# Patient Record
Sex: FEMALE | Race: WHITE | Employment: FULL TIME | URBAN - NONMETROPOLITAN AREA
[De-identification: names, ages, dates, MRNs, and addresses within clinical notes are randomized per-mention and may not be internally consistent; named-entity substitution may affect disease eponyms.]

---

## 2017-01-20 ENCOUNTER — HISTORY (OUTPATIENT)
Dept: FAMILY MEDICINE | Facility: OTHER | Age: 32
End: 2017-01-20

## 2017-01-20 ENCOUNTER — OFFICE VISIT - GICH (OUTPATIENT)
Dept: FAMILY MEDICINE | Facility: OTHER | Age: 32
End: 2017-01-20

## 2017-01-20 DIAGNOSIS — J02.9 ACUTE PHARYNGITIS: ICD-10-CM

## 2017-01-20 LAB — STREP A ANTIGEN - HISTORICAL: NEGATIVE

## 2017-02-13 ENCOUNTER — OFFICE VISIT - GICH (OUTPATIENT)
Dept: FAMILY MEDICINE | Facility: OTHER | Age: 32
End: 2017-02-13

## 2017-02-13 ENCOUNTER — HISTORY (OUTPATIENT)
Dept: FAMILY MEDICINE | Facility: OTHER | Age: 32
End: 2017-02-13

## 2017-02-13 DIAGNOSIS — N92.6 IRREGULAR MENSTRUATION: ICD-10-CM

## 2017-02-13 DIAGNOSIS — Z3A.01 LESS THAN 8 WEEKS GESTATION OF PREGNANCY: ICD-10-CM

## 2017-02-13 LAB — HCG UR QL: POSITIVE

## 2017-02-15 ENCOUNTER — COMMUNICATION - GICH (OUTPATIENT)
Dept: FAMILY MEDICINE | Facility: OTHER | Age: 32
End: 2017-02-15

## 2017-03-10 ENCOUNTER — HISTORY (OUTPATIENT)
Dept: FAMILY MEDICINE | Facility: OTHER | Age: 32
End: 2017-03-10

## 2017-03-10 ENCOUNTER — PRENATAL OFFICE VISIT - GICH (OUTPATIENT)
Dept: FAMILY MEDICINE | Facility: OTHER | Age: 32
End: 2017-03-10

## 2017-03-10 DIAGNOSIS — Z34.80 ENCOUNTER FOR SUPERVISION OF OTHER NORMAL PREGNANCY, UNSPECIFIED TRIMESTER: ICD-10-CM

## 2017-03-10 DIAGNOSIS — Z33.1 PREGNANT STATE, INCIDENTAL: ICD-10-CM

## 2017-03-10 DIAGNOSIS — Z12.4 ENCOUNTER FOR SCREENING FOR MALIGNANT NEOPLASM OF CERVIX: ICD-10-CM

## 2017-03-10 LAB
ABORH - HISTORICAL: NORMAL
ANTIBODY SCREEN - HISTORICAL: NEGATIVE
BILIRUB UR QL: NEGATIVE
CLARITY, URINE: CLEAR CLARITY
COLOR UR: YELLOW COLOR
GLUCOSE URINE: NEGATIVE MG/DL
HEMOGLOBIN: 13 G/DL (ref 12–16)
KETONES UR QL: NEGATIVE MG/DL
LEUKOCYTE ESTERASE URINE: NEGATIVE
MCV RBC AUTO: 88 FL (ref 80–100)
NITRITE UR QL STRIP: NEGATIVE
OCCULT BLOOD,URINE - HISTORICAL: NEGATIVE
PH UR: 6.5 [PH]
PROTEIN QUALITATIVE,URINE - HISTORICAL: NEGATIVE MG/DL
RUBELLA COMMENT - HISTORICAL: NORMAL
SP GR UR STRIP: 1.02
SPECIMEN EXPIRATION DATE/TIME - HISTORICAL: NORMAL
UROBILINOGEN,QUALITATIVE - HISTORICAL: NORMAL EU/DL

## 2017-03-13 LAB
HBSAG CATEGORY - HISTORICAL: NONREACTIVE
HIV-1/HIV-2 ANTIBODY CATEGORY - HISTORICAL: NORMAL

## 2017-03-14 LAB — TREPONEMA PALLIDUM - HISTORICAL: NEGATIVE

## 2017-03-15 ENCOUNTER — HOSPITAL ENCOUNTER (OUTPATIENT)
Dept: RADIOLOGY | Facility: OTHER | Age: 32
End: 2017-03-15
Attending: FAMILY MEDICINE

## 2017-03-15 DIAGNOSIS — Z34.80 ENCOUNTER FOR SUPERVISION OF OTHER NORMAL PREGNANCY, UNSPECIFIED TRIMESTER: ICD-10-CM

## 2017-03-17 LAB — HPV RESULTS - HISTORICAL: NEGATIVE

## 2017-04-07 ENCOUNTER — PRENATAL OFFICE VISIT - GICH (OUTPATIENT)
Dept: FAMILY MEDICINE | Facility: OTHER | Age: 32
End: 2017-04-07

## 2017-04-07 ENCOUNTER — HISTORY (OUTPATIENT)
Dept: FAMILY MEDICINE | Facility: OTHER | Age: 32
End: 2017-04-07

## 2017-04-07 DIAGNOSIS — Z33.1 PREGNANT STATE, INCIDENTAL: ICD-10-CM

## 2017-04-07 LAB
AMORPHOUS - HISTORICAL: PRESENT
BACTERIA URINE: ABNORMAL BACTERIA/HPF
BILIRUB UR QL: NEGATIVE
CLARITY, URINE: ABNORMAL CLARITY
COLOR UR: YELLOW COLOR
EPITHELIAL CELLS: ABNORMAL EPI/HPF
GLUCOSE URINE: NEGATIVE MG/DL
KETONES UR QL: NEGATIVE MG/DL
LEUKOCYTE ESTERASE URINE: ABNORMAL
NITRITE UR QL STRIP: NEGATIVE
OCCULT BLOOD,URINE - HISTORICAL: NEGATIVE
PH UR: 7 [PH]
PROTEIN QUALITATIVE,URINE - HISTORICAL: NEGATIVE MG/DL
RBC - HISTORICAL: ABNORMAL /HPF
SP GR UR STRIP: 1.02
UROBILINOGEN,QUALITATIVE - HISTORICAL: NORMAL EU/DL
WBC - HISTORICAL: ABNORMAL /HPF

## 2017-05-05 ENCOUNTER — HISTORY (OUTPATIENT)
Dept: FAMILY MEDICINE | Facility: OTHER | Age: 32
End: 2017-05-05

## 2017-05-05 ENCOUNTER — PRENATAL OFFICE VISIT - GICH (OUTPATIENT)
Dept: FAMILY MEDICINE | Facility: OTHER | Age: 32
End: 2017-05-05

## 2017-05-05 ENCOUNTER — HOSPITAL ENCOUNTER (OUTPATIENT)
Dept: RADIOLOGY | Facility: OTHER | Age: 32
End: 2017-05-05
Attending: FAMILY MEDICINE

## 2017-05-05 DIAGNOSIS — Z33.1 PREGNANT STATE, INCIDENTAL: ICD-10-CM

## 2017-05-05 LAB
BACTERIA URINE: ABNORMAL BACTERIA/HPF
BILIRUB UR QL: NEGATIVE
CLARITY, URINE: CLEAR CLARITY
COLOR UR: YELLOW COLOR
EPITHELIAL CELLS: ABNORMAL EPI/HPF
GLUCOSE URINE: NEGATIVE MG/DL
KETONES UR QL: NEGATIVE MG/DL
LEUKOCYTE ESTERASE URINE: ABNORMAL
NITRITE UR QL STRIP: NEGATIVE
OCCULT BLOOD,URINE - HISTORICAL: NEGATIVE
PH UR: 7.5 [PH]
PROTEIN QUALITATIVE,URINE - HISTORICAL: NEGATIVE MG/DL
RBC - HISTORICAL: ABNORMAL /HPF
SP GR UR STRIP: 1.02
UROBILINOGEN,QUALITATIVE - HISTORICAL: NORMAL EU/DL
WBC - HISTORICAL: ABNORMAL /HPF

## 2017-05-09 ENCOUNTER — AMBULATORY - GICH (OUTPATIENT)
Dept: FAMILY MEDICINE | Facility: OTHER | Age: 32
End: 2017-05-09

## 2017-05-09 DIAGNOSIS — N76.0 ACUTE VAGINITIS: ICD-10-CM

## 2017-05-09 DIAGNOSIS — B96.89 OTHER SPECIFIED BACTERIAL AGENTS AS THE CAUSE OF DISEASES CLASSIFIED ELSEWHERE: ICD-10-CM

## 2017-06-05 ENCOUNTER — HISTORY (OUTPATIENT)
Dept: FAMILY MEDICINE | Facility: OTHER | Age: 32
End: 2017-06-05

## 2017-06-05 ENCOUNTER — PRENATAL OFFICE VISIT - GICH (OUTPATIENT)
Dept: FAMILY MEDICINE | Facility: OTHER | Age: 32
End: 2017-06-05

## 2017-06-05 DIAGNOSIS — Z33.1 PREGNANT STATE, INCIDENTAL: ICD-10-CM

## 2017-06-05 LAB
BILIRUB UR QL: NEGATIVE
CLARITY, URINE: CLEAR CLARITY
COLOR UR: YELLOW COLOR
GLUCOSE URINE: NEGATIVE MG/DL
KETONES UR QL: NEGATIVE MG/DL
LEUKOCYTE ESTERASE URINE: NEGATIVE
NITRITE UR QL STRIP: NEGATIVE
OCCULT BLOOD,URINE - HISTORICAL: NEGATIVE
PH UR: 6.5 [PH]
PROTEIN QUALITATIVE,URINE - HISTORICAL: NEGATIVE MG/DL
SP GR UR STRIP: 1.01
UROBILINOGEN,QUALITATIVE - HISTORICAL: NORMAL EU/DL

## 2017-06-08 ENCOUNTER — HOSPITAL ENCOUNTER (OUTPATIENT)
Dept: RADIOLOGY | Facility: OTHER | Age: 32
End: 2017-06-08
Attending: FAMILY MEDICINE

## 2017-06-08 DIAGNOSIS — Z33.1 PREGNANT STATE, INCIDENTAL: ICD-10-CM

## 2017-06-27 ENCOUNTER — HISTORY (OUTPATIENT)
Dept: FAMILY MEDICINE | Facility: OTHER | Age: 32
End: 2017-06-27

## 2017-06-27 ENCOUNTER — PRENATAL OFFICE VISIT - GICH (OUTPATIENT)
Dept: FAMILY MEDICINE | Facility: OTHER | Age: 32
End: 2017-06-27

## 2017-06-27 DIAGNOSIS — Z33.1 PREGNANT STATE, INCIDENTAL: ICD-10-CM

## 2017-06-27 LAB
BILIRUB UR QL: NEGATIVE
CLARITY, URINE: CLEAR CLARITY
COLOR UR: YELLOW COLOR
GLUCOSE URINE: NEGATIVE MG/DL
KETONES UR QL: NEGATIVE MG/DL
LEUKOCYTE ESTERASE URINE: NEGATIVE
NITRITE UR QL STRIP: NEGATIVE
OCCULT BLOOD,URINE - HISTORICAL: NEGATIVE
PH UR: 7 [PH]
PROTEIN QUALITATIVE,URINE - HISTORICAL: NEGATIVE MG/DL
SP GR UR STRIP: 1.02
UROBILINOGEN,QUALITATIVE - HISTORICAL: NORMAL EU/DL

## 2017-07-24 ENCOUNTER — COMMUNICATION - GICH (OUTPATIENT)
Dept: FAMILY MEDICINE | Facility: OTHER | Age: 32
End: 2017-07-24

## 2017-07-24 DIAGNOSIS — Z33.1 PREGNANT STATE, INCIDENTAL: ICD-10-CM

## 2017-07-25 ENCOUNTER — AMBULATORY - GICH (OUTPATIENT)
Dept: LAB | Facility: OTHER | Age: 32
End: 2017-07-25

## 2017-07-25 ENCOUNTER — HISTORY (OUTPATIENT)
Dept: FAMILY MEDICINE | Facility: OTHER | Age: 32
End: 2017-07-25

## 2017-07-25 ENCOUNTER — PRENATAL OFFICE VISIT - GICH (OUTPATIENT)
Dept: FAMILY MEDICINE | Facility: OTHER | Age: 32
End: 2017-07-25

## 2017-07-25 DIAGNOSIS — Z33.1 PREGNANT STATE, INCIDENTAL: ICD-10-CM

## 2017-07-25 LAB
BACTERIA URINE: ABNORMAL BACTERIA/HPF
BILIRUB UR QL: NEGATIVE
CLARITY, URINE: CLEAR CLARITY
COLOR UR: YELLOW COLOR
EPITHELIAL CELLS: ABNORMAL EPI/HPF
GLU GEST SCREEN 1HR 50G: 102 MG/DL (ref 65–139)
GLUCOSE URINE: NEGATIVE MG/DL
HEMOGLOBIN: 11.4 G/DL (ref 12–16)
KETONES UR QL: NEGATIVE MG/DL
LEUKOCYTE ESTERASE URINE: NEGATIVE
MCV RBC AUTO: 89 FL (ref 80–100)
NITRITE UR QL STRIP: NEGATIVE
OCCULT BLOOD,URINE - HISTORICAL: NEGATIVE
PATIENT STATUS - HISTORICAL: NORMAL
PH UR: 6 [PH]
PROTEIN QUALITATIVE,URINE - HISTORICAL: 30 MG/DL
RBC - HISTORICAL: ABNORMAL /HPF
SP GR UR STRIP: 1.02
UROBILINOGEN,QUALITATIVE - HISTORICAL: NORMAL EU/DL
WBC - HISTORICAL: ABNORMAL /HPF

## 2017-07-26 LAB — TREPONEMA PALLIDUM - HISTORICAL: NEGATIVE

## 2017-07-27 ENCOUNTER — AMBULATORY - GICH (OUTPATIENT)
Dept: FAMILY MEDICINE | Facility: OTHER | Age: 32
End: 2017-07-27

## 2017-08-08 ENCOUNTER — HISTORY (OUTPATIENT)
Dept: FAMILY MEDICINE | Facility: OTHER | Age: 32
End: 2017-08-08

## 2017-08-08 ENCOUNTER — PRENATAL OFFICE VISIT - GICH (OUTPATIENT)
Dept: FAMILY MEDICINE | Facility: OTHER | Age: 32
End: 2017-08-08

## 2017-08-08 DIAGNOSIS — Z33.1 PREGNANT STATE, INCIDENTAL: ICD-10-CM

## 2017-08-22 ENCOUNTER — PRENATAL OFFICE VISIT - GICH (OUTPATIENT)
Dept: FAMILY MEDICINE | Facility: OTHER | Age: 32
End: 2017-08-22

## 2017-08-22 ENCOUNTER — HISTORY (OUTPATIENT)
Dept: FAMILY MEDICINE | Facility: OTHER | Age: 32
End: 2017-08-22

## 2017-08-22 DIAGNOSIS — Z33.1 PREGNANT STATE, INCIDENTAL: ICD-10-CM

## 2017-08-22 LAB
BACTERIA URINE: ABNORMAL BACTERIA/HPF
BILIRUB UR QL: NEGATIVE
CLARITY, URINE: ABNORMAL CLARITY
COLOR UR: YELLOW COLOR
EPITHELIAL CELLS: ABNORMAL EPI/HPF
GLUCOSE URINE: NEGATIVE MG/DL
KETONES UR QL: NEGATIVE MG/DL
LEUKOCYTE ESTERASE URINE: NEGATIVE
NITRITE UR QL STRIP: NEGATIVE
OCCULT BLOOD,URINE - HISTORICAL: NEGATIVE
PH UR: 7 [PH]
PROTEIN QUALITATIVE,URINE - HISTORICAL: NEGATIVE MG/DL
RBC - HISTORICAL: ABNORMAL /HPF
SP GR UR STRIP: 1.01
UROBILINOGEN,QUALITATIVE - HISTORICAL: NORMAL EU/DL
WBC - HISTORICAL: ABNORMAL /HPF

## 2017-09-05 ENCOUNTER — PRENATAL OFFICE VISIT - GICH (OUTPATIENT)
Dept: FAMILY MEDICINE | Facility: OTHER | Age: 32
End: 2017-09-05

## 2017-09-05 ENCOUNTER — HISTORY (OUTPATIENT)
Dept: FAMILY MEDICINE | Facility: OTHER | Age: 32
End: 2017-09-05

## 2017-09-05 DIAGNOSIS — Z33.1 PREGNANT STATE, INCIDENTAL: ICD-10-CM

## 2017-09-05 DIAGNOSIS — Z23 ENCOUNTER FOR IMMUNIZATION: ICD-10-CM

## 2017-09-05 LAB
BACTERIA URINE: ABNORMAL BACTERIA/HPF
BILIRUB UR QL: NEGATIVE
CLARITY, URINE: CLEAR CLARITY
COLOR UR: YELLOW COLOR
EPITHELIAL CELLS: ABNORMAL EPI/HPF
GLUCOSE URINE: NEGATIVE MG/DL
KETONES UR QL: NEGATIVE MG/DL
LEUKOCYTE ESTERASE URINE: ABNORMAL
NITRITE UR QL STRIP: NEGATIVE
OCCULT BLOOD,URINE - HISTORICAL: NEGATIVE
PH UR: 7 [PH]
PROTEIN QUALITATIVE,URINE - HISTORICAL: NEGATIVE MG/DL
RBC - HISTORICAL: ABNORMAL /HPF
SP GR UR STRIP: 1.01
UROBILINOGEN,QUALITATIVE - HISTORICAL: NORMAL EU/DL
WBC - HISTORICAL: ABNORMAL /HPF

## 2017-09-25 ENCOUNTER — AMBULATORY - GICH (OUTPATIENT)
Dept: FAMILY MEDICINE | Facility: OTHER | Age: 32
End: 2017-09-25

## 2017-09-26 ENCOUNTER — HISTORY (OUTPATIENT)
Dept: FAMILY MEDICINE | Facility: OTHER | Age: 32
End: 2017-09-26

## 2017-09-26 ENCOUNTER — PRENATAL OFFICE VISIT - GICH (OUTPATIENT)
Dept: FAMILY MEDICINE | Facility: OTHER | Age: 32
End: 2017-09-26

## 2017-09-26 DIAGNOSIS — Z33.1 PREGNANT STATE, INCIDENTAL: ICD-10-CM

## 2017-09-26 LAB
BACTERIA URINE: ABNORMAL BACTERIA/HPF
BILIRUB UR QL: NEGATIVE
CLARITY, URINE: ABNORMAL CLARITY
COLOR UR: YELLOW COLOR
EPITHELIAL CELLS: ABNORMAL EPI/HPF
GLUCOSE URINE: NEGATIVE MG/DL
HEMOGLOBIN: 11.7 G/DL (ref 12–16)
KETONES UR QL: NEGATIVE MG/DL
LEUKOCYTE ESTERASE URINE: ABNORMAL
MCV RBC AUTO: 88 FL (ref 80–100)
NITRITE UR QL STRIP: NEGATIVE
OCCULT BLOOD,URINE - HISTORICAL: NEGATIVE
PH UR: 7 [PH]
PROTEIN QUALITATIVE,URINE - HISTORICAL: ABNORMAL MG/DL
RBC - HISTORICAL: ABNORMAL /HPF
SP GR UR STRIP: 1.01
UROBILINOGEN,QUALITATIVE - HISTORICAL: NORMAL EU/DL
WBC - HISTORICAL: ABNORMAL /HPF

## 2017-09-27 LAB
CULTURE - HISTORICAL: ABNORMAL
CULTURE - HISTORICAL: ABNORMAL

## 2017-10-03 ENCOUNTER — HISTORY (OUTPATIENT)
Dept: FAMILY MEDICINE | Facility: OTHER | Age: 32
End: 2017-10-03

## 2017-10-03 ENCOUNTER — PRENATAL OFFICE VISIT - GICH (OUTPATIENT)
Dept: FAMILY MEDICINE | Facility: OTHER | Age: 32
End: 2017-10-03

## 2017-10-03 DIAGNOSIS — Z33.1 PREGNANT STATE, INCIDENTAL: ICD-10-CM

## 2017-10-03 LAB
AMORPHOUS - HISTORICAL: PRESENT
BACTERIA URINE: ABNORMAL BACTERIA/HPF
BILIRUB UR QL: NEGATIVE
CLARITY, URINE: ABNORMAL CLARITY
COLOR UR: YELLOW COLOR
EPITHELIAL CELLS: ABNORMAL EPI/HPF
GLUCOSE URINE: NEGATIVE MG/DL
KETONES UR QL: NEGATIVE MG/DL
LEUKOCYTE ESTERASE URINE: ABNORMAL
NITRITE UR QL STRIP: NEGATIVE
OCCULT BLOOD,URINE - HISTORICAL: NEGATIVE
PH UR: 6.5 [PH]
PROTEIN QUALITATIVE,URINE - HISTORICAL: ABNORMAL MG/DL
RBC - HISTORICAL: ABNORMAL /HPF
SP GR UR STRIP: 1.01
UROBILINOGEN,QUALITATIVE - HISTORICAL: NORMAL EU/DL
WBC - HISTORICAL: ABNORMAL /HPF

## 2017-10-09 ENCOUNTER — PRENATAL OFFICE VISIT - GICH (OUTPATIENT)
Dept: FAMILY MEDICINE | Facility: OTHER | Age: 32
End: 2017-10-09

## 2017-10-09 ENCOUNTER — HISTORY (OUTPATIENT)
Dept: FAMILY MEDICINE | Facility: OTHER | Age: 32
End: 2017-10-09

## 2017-10-09 DIAGNOSIS — Z33.1 PREGNANT STATE, INCIDENTAL: ICD-10-CM

## 2017-10-09 LAB
BACTERIA URINE: ABNORMAL BACTERIA/HPF
BILIRUB UR QL: NEGATIVE
CLARITY, URINE: CLEAR CLARITY
COLOR UR: YELLOW COLOR
EPITHELIAL CELLS: ABNORMAL EPI/HPF
GLUCOSE URINE: NEGATIVE MG/DL
KETONES UR QL: NEGATIVE MG/DL
LEUKOCYTE ESTERASE URINE: ABNORMAL
NITRITE UR QL STRIP: NEGATIVE
OCCULT BLOOD,URINE - HISTORICAL: NEGATIVE
PH UR: 6.5 [PH]
PROTEIN QUALITATIVE,URINE - HISTORICAL: NEGATIVE MG/DL
RBC - HISTORICAL: ABNORMAL /HPF
SP GR UR STRIP: <=1.005
UROBILINOGEN,QUALITATIVE - HISTORICAL: NORMAL EU/DL
WBC - HISTORICAL: ABNORMAL /HPF

## 2017-10-16 ENCOUNTER — HISTORY (OUTPATIENT)
Dept: FAMILY MEDICINE | Facility: OTHER | Age: 32
End: 2017-10-16

## 2017-10-16 ENCOUNTER — PRENATAL OFFICE VISIT - GICH (OUTPATIENT)
Dept: FAMILY MEDICINE | Facility: OTHER | Age: 32
End: 2017-10-16

## 2017-10-16 DIAGNOSIS — Z33.1 PREGNANT STATE, INCIDENTAL: ICD-10-CM

## 2017-10-16 LAB
BACTERIA URINE: ABNORMAL BACTERIA/HPF
BILIRUB UR QL: NEGATIVE
CLARITY, URINE: ABNORMAL CLARITY
COLOR UR: YELLOW COLOR
EPITHELIAL CELLS: ABNORMAL EPI/HPF
GLUCOSE URINE: NEGATIVE MG/DL
KETONES UR QL: NEGATIVE MG/DL
LEUKOCYTE ESTERASE URINE: ABNORMAL
NITRITE UR QL STRIP: NEGATIVE
OCCULT BLOOD,URINE - HISTORICAL: NEGATIVE
PH UR: 6.5 [PH]
PROTEIN QUALITATIVE,URINE - HISTORICAL: NEGATIVE MG/DL
RBC - HISTORICAL: ABNORMAL /HPF
SP GR UR STRIP: <=1.005
UROBILINOGEN,QUALITATIVE - HISTORICAL: NORMAL EU/DL
WBC - HISTORICAL: ABNORMAL /HPF

## 2017-10-23 ENCOUNTER — PRENATAL OFFICE VISIT - GICH (OUTPATIENT)
Dept: FAMILY MEDICINE | Facility: OTHER | Age: 32
End: 2017-10-23

## 2017-10-23 ENCOUNTER — HISTORY (OUTPATIENT)
Dept: FAMILY MEDICINE | Facility: OTHER | Age: 32
End: 2017-10-23

## 2017-10-23 DIAGNOSIS — Z33.1 PREGNANT STATE, INCIDENTAL: ICD-10-CM

## 2017-10-23 LAB
BILIRUB UR QL: NEGATIVE
CLARITY, URINE: CLEAR CLARITY
COLOR UR: YELLOW COLOR
GLUCOSE URINE: NEGATIVE MG/DL
KETONES UR QL: NEGATIVE MG/DL
LEUKOCYTE ESTERASE URINE: NEGATIVE
NITRITE UR QL STRIP: NEGATIVE
OCCULT BLOOD,URINE - HISTORICAL: NEGATIVE
PH UR: 7 [PH]
PROTEIN QUALITATIVE,URINE - HISTORICAL: NEGATIVE MG/DL
SP GR UR STRIP: 1.01
UROBILINOGEN,QUALITATIVE - HISTORICAL: NORMAL EU/DL

## 2017-10-27 ENCOUNTER — HISTORY (OUTPATIENT)
Dept: OBGYN | Facility: OTHER | Age: 32
End: 2017-10-27

## 2017-12-08 ENCOUNTER — HISTORY (OUTPATIENT)
Dept: FAMILY MEDICINE | Facility: OTHER | Age: 32
End: 2017-12-08

## 2017-12-08 ENCOUNTER — OFFICE VISIT - GICH (OUTPATIENT)
Dept: FAMILY MEDICINE | Facility: OTHER | Age: 32
End: 2017-12-08

## 2017-12-08 ASSESSMENT — PATIENT HEALTH QUESTIONNAIRE - PHQ9: SUM OF ALL RESPONSES TO PHQ QUESTIONS 1-9: 0

## 2017-12-27 NOTE — PROGRESS NOTES
Patient Information     Patient Name MRN Sex Sherin Matthew [id number] Female 1985      Progress Notes by Lori Michaud MD at 2017 11:15 AM     Author:  Lori Michaud MD Service:  (none) Author Type:  Physician     Filed:  2017 12:37 PM Encounter Date:  2017 Status:  Signed     :  Lori Michaud MD (Physician)            No bleeding or fluid leaking.  Gets occasional cramps, but no certain contractions.  Follow up in weeks for next OB visit, sooner if any other problems develop.  Lori Michaud MD ....................  2017   12:37 PM

## 2017-12-27 NOTE — PROGRESS NOTES
Patient Information     Patient Name MRN Sherin Vigil [id number] Female 1985      Progress Notes by Charlotte Tripp at 2017  3:07 PM     Author:  Charlotte Tripp Service:  (none) Author Type:  Other Clinical Staff     Filed:  2017  3:54 PM Date of Service:  2017  3:07 PM Status:  Signed     :  Charlotte Tripp (Other Clinical Staff)            Falls Risk Criteria:    Age 65 and older or under age 4        Sensory deficits    Poor vision    Use of ambulatory aides    Impaired judgment    Unable to walk independently    Meets High Risk criteria for falls:  No

## 2017-12-27 NOTE — PROGRESS NOTES
Patient Information     Patient Name MRN Sex Sherin Matthew [id number] Female 1985      Progress Notes by Lori Michaud MD at 2017 11:15 AM     Author:  Lori Michaud MD Service:  (none) Author Type:  Physician     Filed:  2017 11:51 AM Encounter Date:  2017 Status:  Signed     :  Lori Michaud MD (Physician)            No bleeding or fluid leaking.  No contractions.  GBS and Hemoglobin completed today.  Flu shot declined.  Follow up in 1 week.  Lori Michaud MD ....................  2017   11:51 AM

## 2017-12-27 NOTE — PROGRESS NOTES
Patient Information     Patient Name MRN Sex Sherin Matthew [id number] Female 1985      Progress Notes by Lori Michaud MD at 10/3/2017 11:15 AM     Author:  Lori Michaud MD Service:  (none) Author Type:  Physician     Filed:  10/3/2017 12:41 PM Encounter Date:  10/3/2017 Status:  Signed     :  Lori Michaud MD (Physician)            No bleeding or fluid leaking.  No contractions.  GBS +.  Follow up in 1 week for next OB visit.  Lori Michaud MD ....................  10/3/2017   12:41 PM

## 2017-12-27 NOTE — PROGRESS NOTES
Patient Information     Patient Name MRN Sex Sherin Matthew [id number] Female 1985      Progress Notes by Lori Michaud MD at 2017 12:45 PM     Author:  Lori Michaud MD Service:  (none) Author Type:  Physician     Filed:  2017  1:25 PM Encounter Date:  2017 Status:  Signed     :  Lori Michaud MD (Physician)            No bleeding or fluid leaking.  No contractions.  Declines Quad screen.  20 week ultrasound scheduled.  Next appointment scheduled in 3 weeks.  Lori Michaud MD ....................  2017   1:24 PM

## 2017-12-28 NOTE — TELEPHONE ENCOUNTER
Patient Information     Patient Name MRN Sherin Vigil [id number] Female 1985      Telephone Encounter by Mirian Rivers at 2017 10:38 AM     Author:  Mirian Rivers Service:  (none) Author Type:  (none)     Filed:  2017 10:39 AM Encounter Date:  2017 Status:  Signed     :  Mirian Rivers            PT WOULD LIKE TO SPEAK WITH NURSE REGARDING GLUCOSE APPT.

## 2017-12-28 NOTE — PROGRESS NOTES
Patient Information     Patient Name MRN Sex Sherin Matthew [id number] Female 1985      Progress Notes by Lori Michaud MD at 10/16/2017 10:30 AM     Author:  Lori Michaud MD Service:  (none) Author Type:  Physician     Filed:  10/16/2017  4:15 PM Encounter Date:  10/16/2017 Status:  Signed     :  Lori Michaud MD (Physician)            No bleeding or fluid leaking.  No contractions.      Baby Doc: JVC  Dates based on: early ultrasound - 8 week.  Blood Type: A pos  Rubella:  Imm  Flu Shot: declined.  Tdap: 17  Sex of baby; male  GBS +    Follow up in 1 week, sooner if any signs of labor or other concerns.  Lori Michaud MD ....................  10/16/2017   4:15 PM

## 2017-12-28 NOTE — PROGRESS NOTES
Patient Information     Patient Name MRN Sex Sherin Matthew [id number] Female 1985      Progress Notes by Lori Michaud MD at 2017 11:15 AM     Author:  Lori Michaud MD Service:  (none) Author Type:  Physician     Filed:  2017 12:14 PM Encounter Date:  2017 Status:  Signed     :  Lori Michaud MD (Physician)            No bleeding, fluid leaking or contractions.  Tdap updated today.  Follow up in 2 weeks for next OB visit.  Lori Michaud MD ....................  2017   12:00 PM

## 2017-12-28 NOTE — PROGRESS NOTES
Patient Information     Patient Name MRN Sherin Vigil [id number] Female 1985      Progress Notes by Lori Michaud MD at 2017 11:15 AM     Author:  Lori Michaud MD Service:  (none) Author Type:  Physician     Filed:  2017 11:46 AM Encounter Date:  2017 Status:  Signed     :  Lori Michaud MD (Physician)            No bleeding or fluid leaking.  No contractions.  Follow up in 2 weeks, sooner if any problems develop.  Lori Michaud MD ....................  2017   11:45 AM

## 2017-12-28 NOTE — TELEPHONE ENCOUNTER
Patient Information     Patient Name MRN Sherin Vigil [id number] Female 1985      Telephone Encounter by Lori Michaud MD at 2017 12:31 PM     Author:  Lori Michaud MD Service:  (none) Author Type:  Physician     Filed:  2017 12:31 PM Encounter Date:  2017 Status:  Signed     :  Lori Michaud MD (Physician)            Orders for 1 hour GTT, Hemoglobin and Treponema Pallidum were signed.  Lori Michaud MD ....................  2017   12:31 PM

## 2017-12-28 NOTE — PROGRESS NOTES
Patient Information     Patient Name MRN Sex Sherin Matthew [id number] Female 1985      Progress Notes by Lori Michaud MD at 10/9/2017 10:30 AM     Author:  Lori Michaud MD Service:  (none) Author Type:  Physician     Filed:  10/9/2017 11:05 AM Encounter Date:  10/9/2017 Status:  Signed     :  Lori Michaud MD (Physician)            Occasional contractions.  No bleeding or fluid leaking.  Declines flu shot.  Follow up in 1 week for next OB visit, sooner if any other problems develop.  Lori Michaud MD ....................  10/9/2017   11:05 AM

## 2017-12-28 NOTE — PROGRESS NOTES
Patient Information     Patient Name MRN Sex Sherin Matthew [id number] Female 1985      Progress Notes by Lori Michaud MD at 2017 11:15 AM     Author:  Lori Michaud MD Service:  (none) Author Type:  Physician     Filed:  2017 12:55 PM Encounter Date:  2017 Status:  Signed     :  Lori Michaud MD (Physician)            Doing well.  It's a boy!  No bleeding, fluid leaking, cramping.  Reviewed ultrasound results.  Follow up in 4 weeks for next OB visit, sooner if any other problems develop.  Lori Michaud MD ....................  2017   12:36 PM

## 2017-12-28 NOTE — TELEPHONE ENCOUNTER
Patient Information     Patient Name MRN Sherin Vigil [id number] Female 1985      Telephone Encounter by Kelsi Calderon at 2017 10:54 AM     Author:  Kelsi Calderon Service:  (none) Author Type:  (none)     Filed:  2017 11:00 AM Encounter Date:  2017 Status:  Signed     :  Kelsi Calderon            Patient calling wondering if can do Glucola test before appointment tomorrow. Lab order pended.  Kelsi Calderon LPN .............2017  10:57 AM

## 2017-12-28 NOTE — TELEPHONE ENCOUNTER
Patient Information     Patient Name MRN Sherin Vigil [id number] Female 1985      Telephone Encounter by Kelsi Calderon at 2017 12:34 PM     Author:  Kelsi Calderon Service:  (none) Author Type:  (none)     Filed:  2017 12:36 PM Encounter Date:  2017 Status:  Signed     :  Kelsi Calderon            Patient notified transferred to schedule a lab only appointment.  Kelsi Calderon LPN .............2017  12:35 PM

## 2017-12-28 NOTE — PROGRESS NOTES
Patient Information     Patient Name MRN Sex Sherin Matthew [id number] Female 1985      Progress Notes by Lori Michaud MD at 2017 11:15 AM     Author:  Lori Michaud MD Service:  (none) Author Type:  Physician     Filed:  2017 12:01 PM Encounter Date:  2017 Status:  Signed     :  Lori Michaud MD (Physician)            No vaginal bleeding, fluid leaking or contractions.  Hemoglobin 11.4.  1 hour .  Follow up scheduled on 17.  Lori Michaud MD ....................  2017   12:00 PM

## 2017-12-28 NOTE — PROGRESS NOTES
Patient Information     Patient Name MRN Sex Sherin Matthew [id number] Female 1985      Progress Notes by Lori Michaud MD at 10/23/2017 10:30 AM     Author:  Lori Michaud MD Service:  (none) Author Type:  Physician     Filed:  10/23/2017  5:42 PM Encounter Date:  10/23/2017 Status:  Signed     :  Lori Michaud MD (Physician)            Feeling well.  No bleeding or fluid leaking.  Irregular contractions.  Lori Michaud MD ....................  10/23/2017   5:41 PM

## 2017-12-30 NOTE — NURSING NOTE
Patient Information     Patient Name MRN Sherin Vigil [id number] Female 1985      Nursing Note by Brayan Castellanos LPN at 10/16/2017 10:30 AM     Author:  Brayan Castellanos LPN Service:  (none) Author Type:  NURS- Licensed Practical Nurse     Filed:  10/16/2017 10:43 AM Encounter Date:  10/16/2017 Status:  Signed     :  Brayan Castellanos LPN (NURS- Licensed Practical Nurse)            Patient presents to the clinic for her OB visit. She is 38w5d. Two baby steps given.  Brayan Castellanos LPN ..............10/16/2017 10:42 AM

## 2017-12-30 NOTE — NURSING NOTE
Patient Information     Patient Name MRN Sherin Vigil [id number] Female 1985      Nursing Note by Melissa Gruber at 2017 12:45 PM     Author:  Melissa Gruber Service:  (none) Author Type:  (none)     Filed:  2017  1:16 PM Encounter Date:  2017 Status:  Signed     :  Melissa Gruber            1 Baby Steps coupon given today.  Melissa Gruber CMA (AAMA) ....................  2017   12:58 PM

## 2017-12-30 NOTE — NURSING NOTE
Patient Information     Patient Name MRN Sherin Vigil [id number] Female 1985      Nursing Note by Lashell Hendrix at 10/23/2017 10:30 AM     Author:  Lashell Hendrix Service:  (none) Author Type:  (none)     Filed:  10/23/2017 11:01 AM Encounter Date:  10/23/2017 Status:  Signed     :  Lashell Hendrix            Patient here for prenatal visit.  Lashell Hendrix LPN ....................10/23/2017  10:37 AM

## 2017-12-30 NOTE — NURSING NOTE
Patient Information     Patient Name MRN Sherin Vigil [id number] Female 1985      Nursing Note by Lashell Hendrix at 2017 11:15 AM     Author:  Lashell Hendrix Service:  (none) Author Type:  (none)     Filed:  2017 11:31 AM Encounter Date:  2017 Status:  Signed     :  Lashell Hendrix            Patient is here for OB check. 2 baby step coupons given at visit today.  Lashell Hendrix LPN ....................2017  11:24 AM

## 2018-01-03 NOTE — NURSING NOTE
Patient Information     Patient Name MRN Sherin Vigil [id number] Female 1985      Nursing Note by Melany Morgan at 2017  1:45 PM     Author:  Melany Morgan Service:  (none) Author Type:  NURS- Student Practical Nurse     Filed:  2017  2:10 PM Encounter Date:  2017 Status:  Signed     :  Melany Morgan (NURS- Student Practical Nurse)            Patient presents with dryness in throat. No cough or fever present. Symptoms started this morning. Child tested positive for strep. Melany Morgan LPN .............2017  1:47 PM

## 2018-01-03 NOTE — PATIENT INSTRUCTIONS
Patient Information     Patient Name MRN Sherin Vigil [id number] Female 1985      Patient Instructions by Twila Rosales NP at 2017  1:45 PM     Author:  Twila Rosales NP Service:  (none) Author Type:  PHYS- Nurse Practitioner     Filed:  2017  2:11 PM Encounter Date:  2017 Status:  Signed     :  Twila Rosales NP (PHYS- Nurse Practitioner)            Thank you for choosing [facility] and [facility] for your care.     You are advised to contact our office if there is no improvement or if there is worsening of conditions or symptoms, either come back or follow up with your primary care provider.     You were seen in the [facility]. This is for urgent care needs. If you have other questions or concerns please see your primary care provider.     You will need to be evaluated if you start to experience:  Fever higher than 102.5 F (39.2 C)   Trouble seeing or seeing double   Trouble thinking clearly   Trouble breathing or a stiff neck    * If you are a smoker, try to quit *    Call  or go to the emergency room if you:  Have trouble breathing   Chest pain  Abdominal pain    Twila Rosales RN, MSN, FNP  [facility]

## 2018-01-03 NOTE — PROGRESS NOTES
Patient Information     Patient Name MRN Sex Sherin Matthew [id number] Female 1985      Progress Notes by Twila Rosales NP at 2017  1:45 PM     Author:  Twila Rosales NP Service:  (none) Author Type:  PHYS- Nurse Practitioner     Filed:  2017  2:26 PM Encounter Date:  2017 Status:  Signed     :  Twila Rosales NP (PHYS- Nurse Practitioner)            Nursing Notes:   Melany Morgan  2017  2:10 PM  Signed  Patient presents with dryness in throat. No cough or fever present. Symptoms started this morning. Child tested positive for strep. Melany Morgan LPN .............2017  1:47 PM    SUBJECTIVE:    Sherin Antoine is a 31 y.o. female who presents for Throat irritation    Throat Problem    This is a new problem. The current episode started today. Neither side of throat is experiencing more pain than the other. There has been no fever. The patient is experiencing no pain. Pertinent negatives include no abdominal pain, congestion, coughing, diarrhea, drooling, ear discharge, ear pain, headaches, hoarse voice, plugged ear sensation, neck pain, shortness of breath, swollen glands, trouble swallowing or vomiting. Associated symptoms comments: She c/o throat irritation. No other s/s. No fever, chills no URI s/s. . She has had exposure to strep. She has had no exposure to mono. She has tried nothing for the symptoms. The treatment provided no relief.       No current outpatient prescriptions on file prior to visit.     No current facility-administered medications on file prior to visit.        REVIEW OF SYSTEMS:  Review of Systems   HENT: Positive for sore throat. Negative for congestion, drooling, ear discharge, ear pain, hoarse voice and trouble swallowing.    Respiratory: Negative for cough and shortness of breath.    Gastrointestinal: Negative for abdominal pain, diarrhea and vomiting.   Musculoskeletal: Negative for neck pain.   Neurological: Negative  "for headaches.       OBJECTIVE:  /62  Pulse 68  Temp 97.8  F (36.6  C) (Tympanic)  Ht 1.665 m (5' 5.55\")  Wt 78.8 kg (173 lb 12.8 oz)  LMP 01/10/2017  Breastfeeding? No  BMI 28.44 kg/m2    EXAM:   Physical Exam   Constitutional: She is well-developed, well-nourished, and in no distress.   HENT:   Head: Normocephalic and atraumatic.   Right Ear: Tympanic membrane and ear canal normal.   Left Ear: Tympanic membrane and ear canal normal.   Nose: Nose normal.   Mouth/Throat: Uvula is midline, oropharynx is clear and moist and mucous membranes are normal.   Eyes: Conjunctivae are normal.   Neck: Neck supple.   Cardiovascular: Normal rate, regular rhythm and normal heart sounds.    Pulmonary/Chest: Effort normal and breath sounds normal. No respiratory distress. She has no wheezes. She has no rales.   Lymphadenopathy:     She has no cervical adenopathy.   Nursing note and vitals reviewed.    Results for orders placed or performed in visit on 01/20/17      THROAT RAPID STREP A WITH REFLEX      Result  Value Ref Range    STREP A ANTIGEN           Negative Negative     Completed labs at today's visit Rapid Strep.  I personally reviewed the labs with the patient/parent at the visit. Abnormalities include none.     ASSESSMENT/PLAN:    ICD-10-CM    1. Sore throat J02.9 THROAT RAPID STREP A WITH REFLEX      THROAT RAPID STREP A WITH REFLEX        Plan:  Discussed that s/s of strep. Discussed Cent[facility] criteria and that a test is not even needed based on that criteria. Patient education gone over. Home cares and OTC gone over. I explained my diagnostic considerations and recommendations to the patient, who voiced understanding and agreement with the treatment plan. All questions were answered. We discussed potential side effects of any prescribed or recommended therapies, as well as expectations for response to treatments. She was advised to contact our office if there is no improvement or worsening of conditions or symptoms.  " If s/s worsen or persist, patient will either come back or follow up with PCP.        TEAGAN MILLAN NP ....................  1/20/2017   2:25 PM

## 2018-01-03 NOTE — ADDENDUM NOTE
Patient Information     Patient Name MRN Sherin Vigil [id number] Female 1985      Addendum Note by Lily Diaz at 3/16/2017 11:07 AM     Author:  Lily Diaz Service:  (none) Author Type:  (none)     Filed:  3/16/2017 11:07 AM Encounter Date:  3/10/2017 Status:  Signed     :  Lily Diaz       Addended by: LILY DIAZ on: 3/16/2017 11:07 AM        Modules accepted: Orders

## 2018-01-03 NOTE — PATIENT INSTRUCTIONS
Patient Information     Patient Name MRN Sherin Vigil [id number] Female 1985      Patient Instructions by Lori Michaud MD at 3/10/2017  2:48 PM     Author:  Lori Michaud MD Service:  (none) Author Type:  Physician     Filed:  3/10/2017  2:48 PM Encounter Date:  3/10/2017 Status:  Signed     :  Lori Michaud MD (Physician)              Quadruple Test Screening for Neural Tube Defects and Chromosome Abnormalities    Most babies are born healthy. But, about 1 to 2 out of 1,000 babies are born with a  neural tube defect (such as anencephaly or spina bifida). Most of these babies are  born to young women and those who have no family history of neural tube defects.  Less than 1 percent of all babies are born with chromosome abnormalities. The risks  increase with material age. However, most babies with chromosome abnormalities  are born to mothers who are younger than age 35 and have no reason to think they  are at high risk. A serum test may alert unsuspecting parents to these problems.    Neural Tube Defects and Chromosome Abnormalities  Neural tube defects develop in a baby s central nervous system and affect the  spine or brain.   Two common types of neural tube defects are:    *spina bifida: A baby s spine does not close. Instead, the spinal cord sticks out of  the baby s back. Surgery can help correct this disorder. About 80 to 90 percent of  children born with spina bifida develop hydrocephalus (build up of spinal fluid  in the brain). Some children can develop mental retardation. Often, adults with  spina bifida suffer from paralysis or bladder problems.    *anencephaly: The baby s brain does not form properly. Babies are often  delivered stillborn or die within hours (or days) of birth.    Humans have 46 chromosomes in each cell. These chromosomes carry genetic information.  If a baby is born with an extra chromosome, he or she will have a problem  with mental  and physical development.   Two examples of such problems are:    *Down syndrome, which causes mild to moderate mental retardation and may  cause heart defects and other physical disabilities.    *Trisomy 18, which causes many physical problems and more severe mental  retardation than Down syndrome. Babies born with trisomy 18 usually do not  live more than a few months.    The Quadruple Test (Serum Screening)  You may choose to take the triple test (serum screening) when you are between  15 and 19.9 weeks in your pregnancy. This screening test may identify if your unborn  baby has a higher than expected chance of certain birth defects. This test is designed  to help better identify pregnancies at high risk. It may not be as helpful for women  who are already identified as high risk (women older than age 35 or those who have  family history concerns).    The quadruple test measures four of your baby s proteins in your blood:    *alpha-fetoprotein (AFP)    *human chorionic gonadotropin (hCG)    *unconjugated estriol (uE3)    *inhibin A    High levels of AFP indicates an increased risk for an open birth defect such as spina bifida. Some other causes of high AFP are inaccurate gestational age, twins or triplets, or placenta problems. An ultrasound or amniocentesis can help clarify the reason for a high AFP.  Most are healthy babies.  When the levels of AFP, hCG, estriol and inhibin are considered with your age, the lab can estimate the chance that your baby has Down syndrome and trisomy 18. This is just a more precise guess, not a firm yes or no answer.  The test is called positive when the chance of having a baby with Down syndrome is at least the risk faced by a woman who is 35 years old (1 in 270). Amniocentesis is offered to check the baby s chromosomes.  The quadruple test is not 100 percent accurate -- it cannot detect all babies with birth defects.    What a Positive Quadruple Test Means  The quadruple test will  be  positive  in:    *95 to 100 percent of women who carry babies with anencephaly    *80 to 90 percent of women who carry babies with an open spina bifida    *at least 80 percent of the women who carry babies with Down syndrome and a smaller percentage of women who carry babies with other chromosome problems.  Many women who have healthy babies will also have a  positive test.  More testing will help detect those who truly have a birth defect from those who don t.  If you are at an increased risk of having a baby with a neural tube defect or chromosome abnormalities,  you will be offered more specialized testing (known as diagnostic testing). These tests can help identify the actual birth defects or rule them out.    What to Remember About the Test  The quadruple test is your choice. Some women find having the test is reassuring. Others would rather not have the information. The test results may help you make decisions about other testing options. Some women who are already high risk for other reasons may have other diagnostic tests without serum screening.  Genetic counseling is available to help interpret your results and talk with you (and your partner) about what is right in your circumstance.    Please remember the following about the quadruple test:    *Not every positive test means a baby has a birth defect. Most women who have positive tests  have healthy babies.    *Not every negative test means a baby will be born healthy. Not all cases of neural tube defects or Down syndrome can be predicted by this screening test. There are other tests (known as diagnostic tests) that can help provide more information. Please remember, no test can rule out all birth defects.  About 7 percent of women who have this test will have a positive screen. (More follow-up tests will be offered). If you are older than age 35, the chance of a positive screen is much higher than that of a younger women.  Sometimes, there is no way  to explain a positive test result.

## 2018-01-03 NOTE — TELEPHONE ENCOUNTER
Patient Information     Patient Name MRSherin Taylor [id number] Female 1985      Telephone Encounter by Jacinta Mark at 2/15/2017  4:16 PM     Author:  Jacinta Mark Service:  (none) Author Type:  (none)     Filed:  2/15/2017  4:17 PM Encounter Date:  2/15/2017 Status:  Signed     :  Jacinta Mark            The patient needs a proof of pregnancy form done. The form was done and the patient was notified it is done.  Jacinta Mark LPKM..................2/15/2017   4:17 PM

## 2018-01-03 NOTE — PROGRESS NOTES
Patient Information     Patient Name MRN Sex     Sherin Antoine [id number] Female 1985      Progress Notes by Melissa Gruber at 3/10/2017  2:41 PM     Author:  Melissa Gruber Service:  (none) Author Type:  (none)     Filed:  3/10/2017  3:54 PM Encounter Date:  3/10/2017 Status:  Signed     :  Lori Michaud MD (Physician)              PRENATAL EXAM - OB    Sherin Antoine is a 31 y.o. female, G 3, P 2, here today for a prenatal exam.    :  1985  Race/Ethnicity:  /White and         Marital Status:   Anglican:  None  Occupation:  [facility]:  [facility]   [place]'s Provider:  provider Maninder oneil MD      Education (last grade completed):  4 years of college    /Father of baby:  Shin Elina Cosme ()  Partner's Occupation:  [employer]   Contact Phone #:  [phone]    MENSTRUAL HISTORY  LMP:  Patient's last menstrual period was 01/10/2017 (exact date).  Cycle Regularity:  regular, every 28-30 days  Flow:  normal    AIDEE by LMP Calculator:  10/17/17  Date Reliability:  definite  Menarche (age onset):  15 years of age  On BCP's at conception: no     MEDICAL HISTORY  OB History                        Para  Term    AB SAB  TAB   Ectopic Multiple  Living     3  2  2  0  0 0  0   0 0  2                           # Outcome  Date  GA  Lbr Kuldip/2nd   Weight  Sex Delivery   Anes  PTL Lv   3 Current                     2 Term  15  39w0d     3.992 kg (8 lb 12.8 oz)  M Vag-Spont   EPIDURAL  N Y   1 Term  13  40w1d     3.799 kg (8 lb 6 oz)  M Vag   EPIDURAL  N Y                             Past Medical History     Diagnosis  Date     No Significant Past Medical History        Past Surgical History      Procedure  Laterality Date     No past surgeries         Family History:  Family History       Problem   Relation Age of Onset     Other  Mother      chronic back pain         Social  "History:  Social History     Substance Use Topics       Smoking status: Never Smoker     Smokeless tobacco: Never Used     Alcohol use No       RISK FACTORS  Exercise Times/wk:  0  Seat Belt Use: 100%  Alcohol/day: 0  Meds/Drugs/ETOH Since LMP:  No  Birth Control Method: condoms  High Risk Behavior: none    INFECTION HISTORY  Current Drug Use:  none  HIV Risk Evaluation:  low risk  Hepatitis B Risk Evaluation: low risk  Hepatitis B Immunized: yes  TB Exposure: no    Personal History of Genital Herpes: no  Partner History of Genital Herpes: no  Rash/Viral Illness Since LMP: Yes - cold sore  History of STD: no history of STD's  Other:  Discuss sister's condition    REVIEW OF SYSTEMS   Review of Systems Negative.    PHYSICAL EXAM  Visit Vitals       /62     Temp 98.2  F (36.8  C) (Tympanic)     Ht 1.664 m (5' 5.5\")     Wt 80.7 kg (178 lb)     LMP 01/10/2017 (Exact Date)     Breastfeeding No     BMI 29.17 kg/m2     General Appearance:  Alert, appropriate appearance for age. No acute distress  HEENT Exam:  Grossly normal.  Neck / Thyroid Exam:  Supple, no masses, nodes or enlargement.  Chest/Respiratory Exam: Normal chest wall and respirations. Clear to auscultation.  Breast Exam: No dimpling, nipple retraction or discharge. No masses or nodes.  Cardiovascular Exam: Regular rate and rhythm. S1, S2, no murmur, click, gallop, or rubs.  Gastrointestinal Exam: Soft, non-tender, no masses or organomegaly.  Pelvic Exam Female: Vulva and vagina appear normal. Bimanual exam reveals normal uterus and adnexa. Pap smear and GC CHLAMYDIA completed.  Lymphatic Exam: Non-palpable nodes in neck, clavicular, axillary, or inguinal regions.  Musculoskeletal Exam: Back is straight and non-tender, full ROM of upper and lower extremities.  Skin: no rash or abnormalities  Neurologic Exam: Normal gait and speech, no tremor.  Psychiatric Exam: Alert and oriented, appropriate affect.    ASSESSMENT  Satisfactory prenatal exam.  Demonstrates " appropriate and health seeking behaviors toward her pregnancy.  Verbalizes good understanding of prenatal care schedule and the importance of coming to each visit as scheduled.  Has supportive family relationship.     PLAN  Reviewed health maintenance issues including diet, exercise, caffeine intake, handling of cat litter, toxic substances, daily vitamins, child birth classes, choosing a pediatrician, and regular prenatal exams.  She was encouraged to call the office with any questions or concerns.  First OB labs and physical completed.  Declined Flu shot.  ultrasound for dating purposes scheduled. Follow up in 4 weeks.    Lori Michaud MD ....................  3/10/2017   3:54 PM

## 2018-01-03 NOTE — PROGRESS NOTES
Patient Information     Patient Name MRN Sex Sherin Matthew [id number] Female 1985      Progress Notes by Maninder Freeman MD at 2017  1:15 PM     Author:  Maninder Freeman MD Service:  (none) Author Type:  Physician     Filed:  2017  5:33 PM Encounter Date:  2017 Status:  Signed     :  Maninder Freeman MD (Physician)            Nursing Notes:   SaidaheriKari amor  2017  1:46 PM  Signed  Patient here for Pregnancy confirmation.       SUBJECTIVE:  Sherin Antoine  is a 31 y.o. female who comes in today for pregnancy confirmation. She had a positive pregnancy test.  LMP 01/10/17.  EDC 10/17/17.  EGA 5 weeks.  She is having some nausea, tiredness, but minimal breast tenderness. She needs some prenatal vitamins.     Past Medical, Family, and Social History reviewed and updated as noted below.   ROS is negative except as noted above       No Known Allergies,   Family History       Problem   Relation Age of Onset     Other  Mother      chronic back pain     ,   No current outpatient prescriptions on file prior to visit.     No current facility-administered medications on file prior to visit.    ,   Past Medical History     Diagnosis  Date     No Significant Past Medical History    ,   Patient Active Problem List      Diagnosis Date Noted     Active labor at term 2014      (normal spontaneous vaginal delivery) 2014     POSTPARTUM EXAMINATION, NORMAL 2012     HEMORRHOIDS 2012     PREGNANCY 2012   ,   Past Surgical History      Procedure  Laterality Date     No past surgeries      and   Social History     Substance Use Topics       Smoking status: Never Smoker     Smokeless tobacco: Never Used     Alcohol use No     OBJECTIVE:  Visit Vitals       /64     Pulse 68     Wt 79.7 kg (175 lb 12.8 oz)     LMP 01/10/2017 (Exact Date)     BMI 28.76 kg/m2      EXAM:  Alert and cooperative, no distress.  Results for orders placed or performed in visit on 17       PREGNANCY URINE      Result  Value Ref Range    PREGNANCY,URINE           Positive (Positive) Negative      ASSESSMENT/Plan :      Sherin was seen today for pregnancy confirmation.    Diagnoses and all orders for this visit:    Less than 8 weeks gestation of pregnancy  -     prenatal vitamin-folic acid 1 mg (PRENATAL VITAMIN) tablet/capsule; Take 1 tablet by mouth once daily.    Missed period  -     Urine pregnancy test (HCG), qualitative; Future  -     Urine pregnancy test (HCG), qualitative    Discussed diet, activity, prenatal vitamins. Dr. Michaud delivered her oldest son in my absence and she prefers to see her for OB care and so we'll make an appointment for first OB physical next few weeks.  She would like to bring the baby back to see me after delivery and we assured her that that was fine.       Maninder Freeman MD

## 2018-01-03 NOTE — PROGRESS NOTES
Patient Information     Patient Name MRN Sherin Vigil [id number] Female 1985      Progress Notes by Shelby Mckinley R.T. ([facility]) at 3/15/2017  4:20 PM     Author:  [facility]hel[facility] Service:  (none) Author Type:  [employer] - Registered Radiologic Technologist     Filed:  3/15/2017  4:20 PM Date of Service:  3/15/2017  4:20 PM Status:  Signed     :  Shelby Mckinley R.T. (ARRT) ([facility] - Registered Radiologic Technologist)            Falls Risk Criteria:    Age 65 and older or under age 4        Sensory deficits    Poor vision    Use of ambulatory aides    Impaired judgment    Unable to walk independently    Meets High Risk criteria for falls:  no

## 2018-01-03 NOTE — NURSING NOTE
Patient Information     Patient Name MRN Sherin Vigil [id number] Female 1985      Nursing Note by Kari Dawson at 2017  1:15 PM     Author:  Kari Dawson Service:  (none) Author Type:  (none)     Filed:  2017  1:46 PM Encounter Date:  2017 Status:  Signed     :  Kari Dawson            Patient here for Pregnancy confirmation.

## 2018-01-04 NOTE — PROGRESS NOTES
Patient Information     Patient Name MRN Sex Sherin Matthew [id number] Female 1985      Progress Notes by Lori Michaud MD at 2017 11:00 AM     Author:  Lori Michaud MD Service:  (none) Author Type:  Physician     Filed:  2017 12:24 PM Encounter Date:  2017 Status:  Signed     :  Lori Michaud MD (Physician)            Nausea improved.  No bleeding or fluid leaking.  No cramping.  ultrasound completed due to inability to hear FHTs with doptone.  ultrasound showed FHT rate of 153 beats per minute.  20 week ultrasound scheduled.  Follow up in 4 weeks for next OB visit, sooner if any other concerns develop.  Lori Michaud MD

## 2018-01-04 NOTE — PROGRESS NOTES
Patient Information     Patient Name MRN Sex Sherin Matthew [id number] Female 1985      Progress Notes by Lori Michaud MD at 2017 11:00 AM     Author:  Lori Michaud MD Service:  (none) Author Type:  Physician     Filed:  2017 11:37 AM Encounter Date:  2017 Status:  Signed     :  Lori Michaud MD (Physician)            AIDEE adjusted due to ultrasound on 3/15/17 (8w0d - AIDEE 10/25/17).  No bleeding, fluid leaking or cramping.  Prenatal vitamins causing a lot of nausea.  Prescription given for flintstones + folic acid.  Follow up in 4 weeks for next OB visit, sooner if any other problems.  Lori Michaud MD

## 2018-01-04 NOTE — NURSING NOTE
Patient Information     Patient Name MRN Sherin Vigil [id number] Female 1985      Nursing Note by Elba De La Cruz at 2017 11:00 AM     Author:  Elba De La Cruz Service:  (none) Author Type:  (none)     Filed:  2017 11:17 AM Encounter Date:  2017 Status:  Signed     :  Elba De La Cruz            Patient here for prenatal OB visit. No concerns today. Elba De La Cruz LPN .......................2017  11:15 AM

## 2018-01-04 NOTE — PROGRESS NOTES
Patient Information     Patient Name MRN Sherin Vigil [id number] Female 1985      Progress Notes by Shelby Mckinley R.T. ([facility]) at 2017 12:21 PM     Author:  [facility] Service:  (none) Author Type:  [employer] - Registered Radiologic Technologist     Filed:  2017 12:22 PM Date of Service:  2017 12:21 PM Status:  Signed     :  Shelby Mckinley R.T. (ARRT) ([employer] - Registered Radiologic Technologist)            Falls Risk Criteria:    Age 65 and older or under age 4        Sensory deficits    Poor vision    Use of ambulatory aides    Impaired judgment    Unable to walk independently    Meets High Risk criteria for falls:  no

## 2018-01-26 VITALS
SYSTOLIC BLOOD PRESSURE: 126 MMHG | SYSTOLIC BLOOD PRESSURE: 118 MMHG | WEIGHT: 173.8 LBS | TEMPERATURE: 97.8 F | HEIGHT: 66 IN | DIASTOLIC BLOOD PRESSURE: 74 MMHG | HEART RATE: 68 BPM | DIASTOLIC BLOOD PRESSURE: 64 MMHG | WEIGHT: 207 LBS | HEART RATE: 64 BPM | SYSTOLIC BLOOD PRESSURE: 120 MMHG | HEIGHT: 66 IN | HEIGHT: 66 IN | HEART RATE: 76 BPM | BODY MASS INDEX: 33.27 KG/M2 | BODY MASS INDEX: 31.82 KG/M2 | HEIGHT: 66 IN | SYSTOLIC BLOOD PRESSURE: 120 MMHG | DIASTOLIC BLOOD PRESSURE: 70 MMHG | SYSTOLIC BLOOD PRESSURE: 120 MMHG | DIASTOLIC BLOOD PRESSURE: 80 MMHG | WEIGHT: 179 LBS | WEIGHT: 181.8 LBS | DIASTOLIC BLOOD PRESSURE: 62 MMHG | BODY MASS INDEX: 27.93 KG/M2 | HEART RATE: 74 BPM | HEART RATE: 72 BPM | BODY MASS INDEX: 29.75 KG/M2 | BODY MASS INDEX: 28.77 KG/M2 | WEIGHT: 198 LBS

## 2018-01-26 VITALS
WEIGHT: 205 LBS | WEIGHT: 187.2 LBS | TEMPERATURE: 98 F | SYSTOLIC BLOOD PRESSURE: 122 MMHG | DIASTOLIC BLOOD PRESSURE: 68 MMHG | HEART RATE: 76 BPM | DIASTOLIC BLOOD PRESSURE: 76 MMHG | SYSTOLIC BLOOD PRESSURE: 110 MMHG

## 2018-01-26 VITALS
WEIGHT: 211 LBS | HEART RATE: 76 BPM | BODY MASS INDEX: 35.16 KG/M2 | HEIGHT: 65 IN | SYSTOLIC BLOOD PRESSURE: 122 MMHG | DIASTOLIC BLOOD PRESSURE: 80 MMHG

## 2018-01-26 VITALS
DIASTOLIC BLOOD PRESSURE: 80 MMHG | DIASTOLIC BLOOD PRESSURE: 80 MMHG | WEIGHT: 178 LBS | WEIGHT: 198 LBS | BODY MASS INDEX: 34.95 KG/M2 | HEART RATE: 74 BPM | SYSTOLIC BLOOD PRESSURE: 126 MMHG | SYSTOLIC BLOOD PRESSURE: 102 MMHG | SYSTOLIC BLOOD PRESSURE: 122 MMHG | HEIGHT: 66 IN | BODY MASS INDEX: 32.47 KG/M2 | DIASTOLIC BLOOD PRESSURE: 78 MMHG | HEIGHT: 66 IN | WEIGHT: 209.8 LBS | TEMPERATURE: 98.2 F | HEIGHT: 65 IN | DIASTOLIC BLOOD PRESSURE: 62 MMHG | HEART RATE: 76 BPM | BODY MASS INDEX: 31.82 KG/M2 | BODY MASS INDEX: 28.61 KG/M2 | HEART RATE: 68 BPM | HEIGHT: 66 IN | SYSTOLIC BLOOD PRESSURE: 122 MMHG | WEIGHT: 202 LBS

## 2018-01-26 VITALS
SYSTOLIC BLOOD PRESSURE: 100 MMHG | WEIGHT: 193 LBS | HEIGHT: 66 IN | HEART RATE: 74 BPM | DIASTOLIC BLOOD PRESSURE: 60 MMHG | BODY MASS INDEX: 31.02 KG/M2

## 2018-01-26 VITALS
WEIGHT: 186.4 LBS | SYSTOLIC BLOOD PRESSURE: 102 MMHG | DIASTOLIC BLOOD PRESSURE: 62 MMHG | TEMPERATURE: 98.1 F | BODY MASS INDEX: 30.55 KG/M2

## 2018-01-26 VITALS — BODY MASS INDEX: 34.25 KG/M2 | WEIGHT: 209 LBS | SYSTOLIC BLOOD PRESSURE: 126 MMHG | DIASTOLIC BLOOD PRESSURE: 80 MMHG

## 2018-01-26 VITALS
DIASTOLIC BLOOD PRESSURE: 64 MMHG | SYSTOLIC BLOOD PRESSURE: 112 MMHG | BODY MASS INDEX: 28.76 KG/M2 | WEIGHT: 175.8 LBS | HEART RATE: 68 BPM

## 2018-02-01 ASSESSMENT — PATIENT HEALTH QUESTIONNAIRE - PHQ9: SUM OF ALL RESPONSES TO PHQ QUESTIONS 1-9: 0

## 2018-02-09 VITALS
HEART RATE: 72 BPM | DIASTOLIC BLOOD PRESSURE: 80 MMHG | WEIGHT: 188 LBS | SYSTOLIC BLOOD PRESSURE: 124 MMHG | BODY MASS INDEX: 31.32 KG/M2 | HEIGHT: 65 IN

## 2018-02-10 ASSESSMENT — PATIENT HEALTH QUESTIONNAIRE - PHQ9: SUM OF ALL RESPONSES TO PHQ QUESTIONS 1-9: 0

## 2018-02-12 ENCOUNTER — DOCUMENTATION ONLY (OUTPATIENT)
Dept: FAMILY MEDICINE | Facility: OTHER | Age: 33
End: 2018-02-12

## 2018-02-12 RX ORDER — DOCUSATE SODIUM 100 MG/1
200 CAPSULE, LIQUID FILLED ORAL 2 TIMES DAILY PRN
COMMUNITY
Start: 2017-10-29 | End: 2018-09-13

## 2018-02-12 NOTE — PROGRESS NOTES
Patient Information     Patient Name MRN Sex Sherin Matthew [id number] Female 1985      Progress Notes by Lori Michaud MD at 2017  1:00 PM     Author:  Lori Michaud MD Service:  (none) Author Type:  Physician     Filed:  2017  5:00 PM Encounter Date:  2017 Status:  Signed     :  Lori Michaud MD (Physician)            SUBJECTIVE:    Sherin Antoine is a 32 y.o. female who presents for a post-partum check after delivering a baby boy named Cody on 10/27/17.    Bleeding had stopped, but started again a couple days ago.  Notices it only with wiping.  Had stopped for at least a week or two.  Has had some spotting just a couple times per day.      Doesn't want anything for birth control.    Mood ok.    No redness or pain in breasts.  Breast feeding is going well.    HPI  I personally reviewed medications/allergies/history listed below:     No Known Allergies,   Family History       Problem   Relation Age of Onset     Other  Mother      chronic back pain     ,   Current Outpatient Prescriptions on File Prior to Visit       Medication  Sig Dispense Refill     docusate (COLACE) 100 mg capsule Take 2 capsules by mouth 2 times daily if needed for Constipation (This medication is 1st choice for constipation.). 100 capsule 0     Prenatal Multivit-Ca-Min-Fe-FA (PRENATAL VITAMIN) tab tablet Take 1 tablet by mouth once daily.  0     No current facility-administered medications on file prior to visit.    ,   Past Medical History:     Diagnosis  Date     No Significant Past Medical History    ,   Patient Active Problem List    Diagnosis Code   (none) - all problems resolved or deleted     and   Past Surgical History:      Procedure  Laterality Date     NO PAST SURGERIES       Social History     Social History        Marital status:       Spouse name: N/A     Number of children:  N/A     Years of education:  N/A     Occupational History      Not on file.     Social  "History Main Topics        Smoking status:  Never Smoker     Smokeless tobacco:  Never Used     Alcohol use  No     Drug use:  No     Sexual activity:  Yes     Partners: Male     Other Topics  Concern     Not on file      Social History Narrative     Going to [employer].  Hopes to become a pharmacist. Degree in biology.     .     - [employer] - works in wildlife fire management    Son - Osmar    Son - Faraz    Son - Cody      REVIEW OF SYSTEMS:  Review of Systems   Constitutional: Negative for chills and fever.   Skin: Negative for rash.   Psychiatric/Behavioral: Negative for depression.       OBJECTIVE:  /80  Pulse 72  Ht 1.651 m (5' 5\")  Wt 85.3 kg (188 lb)  LMP 01/10/2017 (Exact Date)  BMI 31.28 kg/m2    EXAM:   Physical Exam   Constitutional: She is oriented to person, place, and time and well-developed, well-nourished, and in no distress.   HENT:   Head: Normocephalic and atraumatic.   Right Ear: External ear normal.   Left Ear: External ear normal.   Nose: Nose normal.   Mouth/Throat: Oropharynx is clear and moist.   Eyes: Pupils are equal, round, and reactive to light. No scleral icterus.   Neck: Normal range of motion. Neck supple. No thyromegaly present.   Cardiovascular: Normal rate, regular rhythm, normal heart sounds and intact distal pulses.  Exam reveals no gallop and no friction rub.    No murmur heard.  Pulmonary/Chest: Effort normal and breath sounds normal. No respiratory distress. She has no wheezes. She has no rales. She exhibits no tenderness.   Breast exam:  Deferred per patient.   Abdominal: Soft. Bowel sounds are normal.   Genitourinary: Vagina normal, uterus normal, cervix normal, right adnexa normal and left adnexa normal.   Genitourinary Comments: Perineum is healing well.   Musculoskeletal: Normal range of motion. She exhibits no edema.   Lymphadenopathy:     She has no cervical adenopathy.   Neurological: She is alert and oriented to person, place, and time. She has " normal reflexes. No cranial nerve deficit.   Skin: Skin is warm and dry.   Psychiatric: Affect normal.   PHQ Depression Screen     Over the last 2 weeks, how often have you been bothered by any of the following problems?  1. Little interest or pleasure in doing things: 0 - Not at all  2. Feeling down, depressed, or hopeless: 0 - Not at all  3. Trouble falling or staying asleep, or sleeping too much: 0 - Not at all  4. Feeling tired or having little energy: 0 - Not at all  5. Poor appetite or overeatin - Not at all  6. Feeling bad about yourself - or that you are a failure or have let yourself or your family down: 0 - Not at all  7. Trouble concentrating on things, such as reading the newspaper or watching television: 0 - Not at all  8. Moving or speaking so slowly that other people could have noticed. Or the opposite - being so fidgety or restless that you have been moving around a lot more than usual: 0 - Not at all  9. Thoughts that you would be better off dead, or of hurting yourself in some way: 0 - Not at all    PHQ-9 TOTAL SCORE: 0  Depression Severity Level: none                ASSESSMENT/PLAN:    ICD-10-CM    1. Postpartum care and examination Z39.2         Plan:    1.  Doing well.  If prolonged bleeding, consider ultrasound to rule out any retained products.  Seems to be resolving.  No contraception as above.    Lori Michaud MD ....................  2017   4:59 PM

## 2018-02-12 NOTE — NURSING NOTE
Patient Information     Patient Name MRN Sherin Vigil [id number] Female 1985      Nursing Note by Lashell Hendrix at 2017  1:00 PM     Author:  Lashell Hendrix Service:  (none) Author Type:  (none)     Filed:  2017  1:21 PM Encounter Date:  2017 Status:  Signed     :  Lashell Hendrix            Patient is here for post partum check .  Lashell Hendrix LPN ....................2017  12:56 PM

## 2018-09-10 ENCOUNTER — TELEPHONE (OUTPATIENT)
Dept: FAMILY MEDICINE | Facility: OTHER | Age: 33
End: 2018-09-10

## 2018-09-10 NOTE — TELEPHONE ENCOUNTER
After the patient's name and date of birth was verified, the patient was told the below information.  Jacinta Mark LPN..................9/10/2018   4:48 PM

## 2018-09-10 NOTE — TELEPHONE ENCOUNTER
It is considered normal skin richard. There is staph and strep usually on the skin, the MRSA is just a resistant type of staph. She should try and avoid touching the drainage or spreading the drainage from leg to anything else. Apart from that, nothing to do different with children.

## 2018-09-10 NOTE — TELEPHONE ENCOUNTER
The patient's niece lives with her. She has a sore on the back of her leg. They told her today it was MRSA. She has 3 children 6 and under and one is a just under a year old. How concerned should she be about this and is there anything special she should do?  Jacinta Mark LPN..................9/10/2018   1:45 PM

## 2018-09-13 ENCOUNTER — OFFICE VISIT (OUTPATIENT)
Dept: FAMILY MEDICINE | Facility: OTHER | Age: 33
End: 2018-09-13
Attending: NURSE PRACTITIONER
Payer: COMMERCIAL

## 2018-09-13 VITALS
HEART RATE: 75 BPM | TEMPERATURE: 97.1 F | SYSTOLIC BLOOD PRESSURE: 112 MMHG | DIASTOLIC BLOOD PRESSURE: 80 MMHG | BODY MASS INDEX: 29.89 KG/M2 | WEIGHT: 179.6 LBS

## 2018-09-13 DIAGNOSIS — T36.95XA ANTIBIOTIC-INDUCED YEAST INFECTION: ICD-10-CM

## 2018-09-13 DIAGNOSIS — H66.002 LEFT ACUTE SUPPURATIVE OTITIS MEDIA: Primary | ICD-10-CM

## 2018-09-13 DIAGNOSIS — H92.02 ACUTE EAR PAIN, LEFT: ICD-10-CM

## 2018-09-13 DIAGNOSIS — J06.9 VIRAL URI WITH COUGH: ICD-10-CM

## 2018-09-13 DIAGNOSIS — B37.9 ANTIBIOTIC-INDUCED YEAST INFECTION: ICD-10-CM

## 2018-09-13 PROCEDURE — 99213 OFFICE O/P EST LOW 20 MIN: CPT | Performed by: NURSE PRACTITIONER

## 2018-09-13 RX ORDER — AMOXICILLIN 875 MG
875 TABLET ORAL 2 TIMES DAILY
Qty: 20 TABLET | Refills: 0 | Status: SHIPPED | OUTPATIENT
Start: 2018-09-13 | End: 2018-09-23

## 2018-09-13 RX ORDER — FLUCONAZOLE 150 MG/1
150 TABLET ORAL
Qty: 1 TABLET | Refills: 0 | Status: SHIPPED | OUTPATIENT
Start: 2018-09-13 | End: 2020-05-28

## 2018-09-13 ASSESSMENT — PAIN SCALES - GENERAL: PAINLEVEL: SEVERE PAIN (6)

## 2018-09-13 NOTE — PROGRESS NOTES
HPI:    Sherin Antoine is a 33 year old female  who presents to clinic today for ear pain.    Bilateral ears with cracking, popping, and pressure for just over  the past week.  Left ear pain started last night.  States pain is severe.  Runny and stuffy nose, cough, chest congestion, sore throat started just over a week ago.  Sore throat lasted a day and resolved.  Nasal drainage and congestion persisting but less severe.  Coughing up phlegm.  No shortness of breath.  Fever initially for a day, none since.  Appetite fair to normal.  Energy low initially for 3 days but now returning to normal.  Headache initially x 4 days, resolved.    Taking Ibuprofen for headaches and now for ear pain.      Breast feeding.        Past Medical History:   Diagnosis Date     Personal history of other medical treatment (CODE)     No Comments Provided     Past Surgical History:   Procedure Laterality Date     OTHER SURGICAL HISTORY      [id number],NO PAST SURGERIES     Social History   Substance Use Topics     Smoking status: Never Smoker     Smokeless tobacco: Never Used     Alcohol use No     No current outpatient prescriptions on file.     No Known Allergies      Past medical history, past surgical history, current medications and allergies reviewed and accurate to the best of my knowledge.        ROS:  Refer to HPI    /80 (BP Location: Left arm, Patient Position: Sitting, Cuff Size: Adult Regular)  Pulse 75  Temp 97.1  F (36.2  C) (Tympanic)  Wt 179 lb 9.6 oz (81.5 kg)  Breastfeeding? Yes  BMI 29.89 kg/m2    EXAM:  General Appearance: Well appearing adult female, appropriate appearance for age. No acute distress  Head: normocephalic, atraumatic  Ears: Left TM intact with no visible bony landmarks appreciated, erythematous with purulent effusion with bulging.  Right TM grey, translucent with bony landmarks appreciated, no erythema, no effusion, no bulging, no purulence.  Left auditory canal clear.  Right auditory canal clear.   Normal external ears, non tender.  Eyes: conjunctivae normal without erythema or irritation, no drainage or crusting, no eyelid swelling, pupils equal   Orophayrnx: moist mucous membranes, posterior pharynx without erythema, tonsils without hypertrophy, no erythema, no exudates or petechiae, no post nasal drip seen.    Neck: supple without adenopathy  Respiratory: normal chest wall and respirations.  Normal effort.  Clear to auscultation bilaterally, no wheezing, crackles or rhonchi.  No increased work of breathing.  No cough appreciated.  Cardiac: RRR with no murmurs  Musculoskeletal:  Normal gait.  Equal movement of bilateral upper extremities.  Equal movement of bilateral lower extremities.    Psychological: normal affect, alert and pleasant          ASSESSMENT/PLAN:    ICD-10-CM    1. Left acute suppurative otitis media H66.002 amoxicillin (AMOXIL) 875 MG tablet   2. Acute ear pain, left H92.02    3. Viral URI with cough J06.9     B97.89    4. Antibiotic-induced yeast infection B37.9 fluconazole (DIFLUCAN) 150 MG tablet    T36.95XA          Amoxicillin 875 mg BID x 7 to 10 days.  Instructed to complete minimum of 7 days and if resolved may stop after 7 days, if symptoms lingering then complete entire 10 days.      Diflucan 150 mg x 1 PRN if vaginal yeast symptoms develop.      Symptomatic treatment of viral URI symptoms -  Encouraged fluids, salt water gargles, honey, elevation, humidifier, sinus rinse/netti pot, lozenges, etc     Tylenol or ibuprofen PRN    Discussed warning signs/symptoms indicative of need to f/u    Follow up if symptoms persist or worsen or concerns

## 2018-09-13 NOTE — NURSING NOTE
EAR PAIN  Onset: last night  Location:  left  Fever:  A few days ago  Recent URI:  Congestion, cough productive yellow  Discharge:  no  Able to sleep:  no  Pain Scale:  6  FLORINDA Shirley............9/13/2018  11:48 AM  Medication Reconciliation: complete    FLORINDA Shirley.................9/13/2018   11:48 AM

## 2018-09-13 NOTE — MR AVS SNAPSHOT
"              After Visit Summary   9/13/2018    Sherin Antoine    MRN: [id number]           Patient Information     Date Of Birth          1985        Visit Information        Provider Department      9/13/2018 11:30 AM Belkys Rodney NP [facility]        Today's Diagnoses     Left acute suppurative otitis media    -  1    Acute ear pain, left        Viral URI with cough        Antibiotic-induced yeast infection          Care Instructions    Amoxicillin twice daily x 7 to 10 days     Diflucan x 1 dose if needed for yeast infection       Tylenol or ibuprofen as needed      Follow up as need          Follow-ups after your visit        Who to contact     If you have questions or need follow up information about today's clinic visit or your schedule please contact [facility] [place] directly at [phone].  Normal or non-critical lab and imaging results will be communicated to you by Merchantryhart, letter or phone within 4 business days after the clinic has received the results. If you do not hear from us within 7 days, please contact the clinic through MyChart or phone. If you have a critical or abnormal lab result, we will notify you by phone as soon as possible.  Submit refill requests through Boosted Boards or call your pharmacy and they will forward the refill request to us. Please allow 3 business days for your refill to be completed.          Additional Information About Your Visit        Merchantryhart Information     [employer]ards lets you send messages to your doctor, view your test results, renew your prescriptions, schedule appointments and more. To sign up, go to [email or web address] . Click on \"Log in\" on the left side of the screen, which will take you to the Welcome page. Then click on \"Sign up Now\" on the right side of the page.     You will be asked to enter the access code listed below, as well as some personal information. Please follow the directions to create your username " and password.     Your access code is: [email or web address]  Expires: 2018 11:52 AM     Your access code will  in 90 days. If you need help or a new code, please call your [place] clinic or [phone].        Care EveryWhere ID     This is your Care EveryWhere ID. This could be used by other organizations to access your [place] medical records  [id number]        Your Vitals Were     Pulse Temperature Breastfeeding? BMI (Body Mass Index)          75 97.1  F (36.2  C) (Tympanic) Yes 29.89 kg/m2         Blood Pressure from Last 3 Encounters:   18 112/80   17 124/80   10/23/17 122/80    Weight from Last 3 Encounters:   18 179 lb 9.6 oz (81.5 kg)   17 188 lb (85.3 kg)   10/23/17 211 lb (95.7 kg)              Today, you had the following     No orders found for display         Today's Medication Changes          These changes are accurate as of 18 12:03 PM.  If you have any questions, ask your nurse or doctor.               Start taking these medicines.        Dose/Directions    amoxicillin 875 MG tablet   Commonly known as:  AMOXIL   Used for:  Left acute suppurative otitis media   Started by:  Belkys Rodney NP        Dose:  875 mg   Take 1 tablet (875 mg) by mouth 2 times daily for 10 days   Quantity:  20 tablet   Refills:  0       fluconazole 150 MG tablet   Commonly known as:  DIFLUCAN   Used for:  Antibiotic-induced yeast infection   Started by:  Belkys Rodney NP        Dose:  150 mg   Take 1 tablet (150 mg) by mouth once as needed   Quantity:  1 tablet   Refills:  0            Where to get your medicines      These medications were sent to [id number] - [place], [place] - [address] , [facility] [address]     Phone:  [phone]     amoxicillin 875 MG tablet    fluconazole 150 MG tablet                Primary Care Provider Office Phone # Fax #    Maninder Freeman [phone]  [facility] [address]        Equal Access to Services     SANDRA THOMAS : Hadii flaquito Fung, blossom meade, tessie calderamaleonardo garcia, sussy briseno. So [facility] [phone].    ATENCIÓN: Si habla español, tiene a gupta disposición servicios gratuitos de asistencia lingüística. Llame al [phone].    We comply with applicable federal civil rights laws and [place] laws. We do not discriminate on the basis of race, color, national origin, age, disability, sex, sexual orientation, or gender identity.            Thank you!     Thank you for choosing [facility] AND [facility]  for your care. Our goal is always to provide you with excellent care. Hearing back from our patients is one way we can continue to improve our services. Please take a few minutes to complete the written survey that you may receive in the mail after your visit with us. Thank you!             Your Updated Medication List - Protect others around you: Learn how to safely use, store and throw away your medicines at www.disposemymeds.org.          This list is accurate as of 9/13/18 12:03 PM.  Always use your most recent med list.                   Brand Name Dispense Instructions for use Diagnosis    amoxicillin 875 MG tablet    AMOXIL    20 tablet    Take 1 tablet (875 mg) by mouth 2 times daily for 10 days    Left acute suppurative otitis media       fluconazole 150 MG tablet    DIFLUCAN    1 tablet    Take 1 tablet (150 mg) by mouth once as needed    Antibiotic-induced yeast infection

## 2018-09-13 NOTE — PATIENT INSTRUCTIONS
Amoxicillin twice daily x 7 to 10 days     Diflucan x 1 dose if needed for yeast infection       Tylenol or ibuprofen as needed      Follow up as need

## 2018-10-30 ENCOUNTER — ALLIED HEALTH/NURSE VISIT (OUTPATIENT)
Dept: FAMILY MEDICINE | Facility: OTHER | Age: 33
End: 2018-10-30
Attending: FAMILY MEDICINE
Payer: COMMERCIAL

## 2018-10-30 DIAGNOSIS — Z23 NEED FOR PROPHYLACTIC VACCINATION AND INOCULATION AGAINST INFLUENZA: Primary | ICD-10-CM

## 2018-10-30 PROCEDURE — 90471 IMMUNIZATION ADMIN: CPT

## 2018-10-30 PROCEDURE — 90686 IIV4 VACC NO PRSV 0.5 ML IM: CPT

## 2018-10-30 NOTE — PROGRESS NOTES

## 2018-10-30 NOTE — MR AVS SNAPSHOT
"              After Visit Summary   10/30/2018    Sherin Antoine    MRN: [id number]           Patient Information     Date Of Birth          1985        Visit Information        Provider Department      10/30/2018 4:20 PM [facility] FLU [facility]        Today's Diagnoses     Need for prophylactic vaccination and inoculation against influenza    -  1       Follow-ups after your visit        Who to contact     If you have questions or need follow up information about today's clinic visit or your schedule please contact [facility] directly at [phone].  Normal or non-critical lab and imaging results will be communicated to you by Dune Networkshart, letter or phone within 4 business days after the clinic has received the results. If you do not hear from us within 7 days, please contact the clinic through [employer]t or phone. If you have a critical or abnormal lab result, we will notify you by phone as soon as possible.  Submit refill requests through GenieBelt or call your pharmacy and they will forward the refill request to us. Please allow 3 business days for your refill to be completed.          Additional Information About Your Visit        MyChart Information     GenieBelt lets you send messages to your doctor, view your test results, renew your prescriptions, schedule appointments and more. To sign up, go to [email or web address] . Click on \"Log in\" on the left side of the screen, which will take you to the Welcome page. Then click on \"Sign up Now\" on the right side of the page.     You will be asked to enter the access code listed below, as well as some personal information. Please follow the directions to create your username and password.     Your access code is: [email or web address]  Expires: 2018 11:52 AM     Your access code will  in 90 days. If you need help or a new code, please call your [place] clinic or [phone].        Care EveryWhere ID     This is your Care " EveryWhere ID. This could be used by other organizations to access your [place] medical records  [id number]         Blood Pressure from Last 3 Encounters:   09/13/18 112/80   12/08/17 124/80   10/23/17 122/80    Weight from Last 3 Encounters:   09/13/18 179 lb 9.6 oz (81.5 kg)   12/08/17 188 lb (85.3 kg)   10/23/17 211 lb (95.7 kg)              We Performed the Following     HC FLU VAC PRESRV FREE QUAD SPLIT VIR 3+YRS IM     Vaccine Administration, Initial [[id number]]        Primary Care Provider Office Phone # Fax #    Maninder GARSIA [phone] [address] [facility] [address]        Equal Access to Services     SANDRA THOMAS : Hadii flaquito hinkleo Sotaj, waaxda luqadaha, qaybta kaalmada adeegyada, sussy chan . So [facility] [phone].    ATENCIÓN: Si habla español, tiene a gupta disposición servicios gratuitos de asistencia lingüística. Llame al [phone].    We comply with applicable federal civil rights laws and [place] laws. We do not discriminate on the basis of race, color, national origin, age, disability, sex, sexual orientation, or gender identity.            Thank you!     Thank you for choosing [facility] AND [facility]  for your care. Our goal is always to provide you with excellent care. Hearing back from our patients is one way we can continue to improve our services. Please take a few minutes to complete the written survey that you may receive in the mail after your visit with us. Thank you!             Your Updated Medication List - Protect others around you: Learn how to safely use, store and throw away your medicines at www.disposemymeds.org.          This list is accurate as of 10/30/18  5:50 PM.  Always use your most recent med list.                   Brand Name Dispense Instructions for use Diagnosis    fluconazole 150 MG tablet    DIFLUCAN    1 tablet    Take 1 tablet (150 mg) by mouth once as needed     Antibiotic-induced yeast infection

## 2019-09-11 ENCOUNTER — TELEPHONE (OUTPATIENT)
Dept: FAMILY MEDICINE | Facility: OTHER | Age: 34
End: 2019-09-11

## 2019-09-11 NOTE — TELEPHONE ENCOUNTER
Patient states that she stubbed her toe and she thought that she may have broke it.  She states that toe is bruised and the bruise goes all the way up into her foot.  Patient states that the toe hurt really bad yesterday but it does not hurt today and she is able to walk around on it fine.  Patient is wanting to know if she should come in to be seen for this.  Niyah Graham LPN........................9/11/2019  11:25 AM

## 2019-09-11 NOTE — TELEPHONE ENCOUNTER
Called patient with below information, after patient giving last name and date of birth.  Brayan Castellanos LPN,BEKAHN ..............9/11/2019 1:16 PM

## 2019-09-11 NOTE — TELEPHONE ENCOUNTER
She should ice it, elevate it, and jorge luis tape it to the next toe with cotton in between them.  Even if it is broken, we typically would not do anything more.  I'm always happy to see her.  Maninder Freeman MD on 9/11/2019 at 12:24 PM

## 2019-10-23 ENCOUNTER — ALLIED HEALTH/NURSE VISIT (OUTPATIENT)
Dept: FAMILY MEDICINE | Facility: OTHER | Age: 34
End: 2019-10-23
Attending: FAMILY MEDICINE
Payer: COMMERCIAL

## 2019-10-23 DIAGNOSIS — Z23 NEED FOR PROPHYLACTIC VACCINATION AND INOCULATION AGAINST INFLUENZA: Primary | ICD-10-CM

## 2019-10-23 PROCEDURE — 90686 IIV4 VACC NO PRSV 0.5 ML IM: CPT

## 2019-10-23 PROCEDURE — 90471 IMMUNIZATION ADMIN: CPT

## 2020-01-24 ENCOUNTER — MYC MEDICAL ADVICE (OUTPATIENT)
Dept: FAMILY MEDICINE | Facility: OTHER | Age: 35
End: 2020-01-24

## 2020-01-27 ENCOUNTER — TELEPHONE (OUTPATIENT)
Dept: FAMILY MEDICINE | Facility: OTHER | Age: 35
End: 2020-01-27

## 2020-01-27 ENCOUNTER — MYC MEDICAL ADVICE (OUTPATIENT)
Dept: FAMILY MEDICINE | Facility: OTHER | Age: 35
End: 2020-01-27

## 2020-01-27 NOTE — TELEPHONE ENCOUNTER
This is a personal issue. Just needed Maninder Freeman MD  E-mail address.  Jacinta Mark LPN..................1/27/2020   3:41 PM

## 2020-03-11 ENCOUNTER — HEALTH MAINTENANCE LETTER (OUTPATIENT)
Age: 35
End: 2020-03-11

## 2020-03-22 ENCOUNTER — OFFICE VISIT (OUTPATIENT)
Dept: FAMILY MEDICINE | Facility: OTHER | Age: 35
End: 2020-03-22
Attending: NURSE PRACTITIONER
Payer: COMMERCIAL

## 2020-03-22 VITALS
BODY MASS INDEX: 30.63 KG/M2 | RESPIRATION RATE: 16 BRPM | WEIGHT: 190.6 LBS | DIASTOLIC BLOOD PRESSURE: 78 MMHG | SYSTOLIC BLOOD PRESSURE: 118 MMHG | TEMPERATURE: 98.6 F | HEIGHT: 66 IN | HEART RATE: 66 BPM | OXYGEN SATURATION: 98 %

## 2020-03-22 DIAGNOSIS — B97.89 VIRAL SORE THROAT: Primary | ICD-10-CM

## 2020-03-22 DIAGNOSIS — Z01.89 PATIENT REQUEST FOR DIAGNOSTIC TESTING: ICD-10-CM

## 2020-03-22 DIAGNOSIS — J02.8 VIRAL SORE THROAT: Primary | ICD-10-CM

## 2020-03-22 LAB
SPECIMEN SOURCE: NORMAL
STREP GROUP A PCR: NOT DETECTED

## 2020-03-22 PROCEDURE — 87651 STREP A DNA AMP PROBE: CPT | Mod: ZL | Performed by: NURSE PRACTITIONER

## 2020-03-22 PROCEDURE — 99213 OFFICE O/P EST LOW 20 MIN: CPT | Performed by: NURSE PRACTITIONER

## 2020-03-22 ASSESSMENT — MIFFLIN-ST. JEOR: SCORE: 1578.37

## 2020-03-22 ASSESSMENT — PAIN SCALES - GENERAL: PAINLEVEL: MODERATE PAIN (4)

## 2020-03-22 NOTE — PROGRESS NOTES
"HPI:    Sherin Antoine is a 34 year old female  who presents to clinic today for strep test.    Sore throat started yesterday.  Scratchy feeling on left side.  States strep exposure from nephew.  Son with white spots in throat.  Denies fevers.  Stuffy nose started last night.  No cough.  No ear pain.  No headaches.  No body aches.  No change in appetite.  No change in energy.  No OTC medications.  Had flu shot      Past Medical History:   Diagnosis Date     Personal history of other medical treatment (CODE)     No Comments Provided     Past Surgical History:   Procedure Laterality Date     OTHER SURGICAL HISTORY      [id number],NO PAST SURGERIES     Social History     Tobacco Use     Smoking status: Never Smoker     Smokeless tobacco: Never Used   Substance Use Topics     Alcohol use: No     Alcohol/week: 0.0 standard drinks     Current Outpatient Medications   Medication Sig Dispense Refill     fluconazole (DIFLUCAN) 150 MG tablet Take 1 tablet (150 mg) by mouth once as needed (Patient not taking: Reported on 3/22/2020) 1 tablet 0     No Known Allergies      Past medical history, past surgical history, current medications and allergies reviewed and accurate to the best of my knowledge.        ROS:  Refer to HPI    /78   Pulse 66   Temp 98.6  F (37  C) (Tympanic)   Resp 16   Ht 1.664 m (5' 5.5\")   Wt 86.5 kg (190 lb 9.6 oz)   SpO2 98%   BMI 31.24 kg/m      EXAM:  General Appearance: Well appearing adult female, non ill appearance, appropriate appearance for age. No acute distress  Ears: Left TM intact, translucent with bony landmarks appreciated, no erythema, no effusion, no bulging, no purulence.  Right TM intact, translucent with bony landmarks appreciated, no erythema, no effusion, no bulging, no purulence.  Left auditory canal clear.  Right auditory canal clear.  Normal external ears, non tender.  Eyes: conjunctivae normal without erythema or irritation, corneas clear, no drainage or crusting, no " eyelid swelling, pupils equal   Orophayrnx: moist mucous membranes, posterior pharynx with mild erythema, tonsils without hypertrophy, mild erythema, no exudates or petechiae, no post nasal drip seen, no trismus, voice clear.    Nose:  Bilateral nares: no erythema, no edema, no drainage or congestion   Neck: mild single left anterior cervical lymph node enlargement with mild tenderness to palpation   Respiratory: normal chest wall and respirations.  Normal effort.  Clear to auscultation bilaterally, no wheezing, crackles or rhonchi.  No increased work of breathing.  No cough appreciated.  Cardiac: RRR with no murmurs  Musculoskeletal:  Equal movement of bilateral upper extremities.  Equal movement of bilateral lower extremities.  Normal gait.    Psychological: normal affect, alert, oriented, and pleasant.       Labs:  Results for orders placed or performed in visit on 03/22/20   Group A Streptococcus PCR Throat Swab     Status: None    Specimen: Throat   Result Value Ref Range    Specimen Description Throat     Strep Group A PCR Not Detected NDET^Not Detected               ASSESSMENT/PLAN:  1. Patient request for diagnostic testing    - Group A Streptococcus PCR Throat Swab    2. Viral sore throat    Negative strep PCR test       Discussed with patient that symptoms and exam are consistent with viral illness.      Recommend symptomatic treatment, social distancing, and home quarantine during this time of world wide epidemic of COVID-19.    Symptomatic treatment - Encouraged fluids, salt water gargles, honey, elevation, humidifier, lozenges, etc     May use over-the-counter Tylenol or ibuprofen PRN  May use over the counter cough or cold medication PRN    Discussed warning signs/symptoms indicative of need to f/u    Follow up if symptoms persist or worsen or concerns      I explained my diagnostic considerations and recommendations to the patient, who voiced understanding and agreement with the treatment plan. All  questions were answered. We discussed potential side effects of any prescribed or recommended therapies, as well as expectations for response to treatments.    Disclaimer:  This note consists of words and symbols derived from keyboarding, dictation, or using voice recognition software. As a result, there may be errors in the script that have gone undetected. Please consider this when interpreting information found in this note.

## 2020-03-22 NOTE — NURSING NOTE
"Chief Complaint   Patient presents with     Pharyngitis     Patient is here for a sore throat that started yesterday. Patient states no fevers. Patient has had strep exposure.     Initial /78   Pulse 66   Temp 98.6  F (37  C) (Tympanic)   Resp 16   Ht 1.664 m (5' 5.5\")   Wt 86.5 kg (190 lb 9.6 oz)   SpO2 98%   BMI 31.24 kg/m   Estimated body mass index is 31.24 kg/m  as calculated from the following:    Height as of this encounter: 1.664 m (5' 5.5\").    Weight as of this encounter: 86.5 kg (190 lb 9.6 oz).  Medication Reconciliation: complete    Pauly Fishman LPN  "

## 2020-05-28 ENCOUNTER — OFFICE VISIT (OUTPATIENT)
Dept: FAMILY MEDICINE | Facility: OTHER | Age: 35
End: 2020-05-28
Attending: NURSE PRACTITIONER
Payer: COMMERCIAL

## 2020-05-28 VITALS
HEIGHT: 66 IN | DIASTOLIC BLOOD PRESSURE: 62 MMHG | RESPIRATION RATE: 16 BRPM | HEART RATE: 73 BPM | OXYGEN SATURATION: 98 % | WEIGHT: 191 LBS | SYSTOLIC BLOOD PRESSURE: 112 MMHG | BODY MASS INDEX: 30.7 KG/M2 | TEMPERATURE: 98.3 F

## 2020-05-28 DIAGNOSIS — H93.8X1 IRRITATION OF RIGHT EAR: Primary | ICD-10-CM

## 2020-05-28 PROCEDURE — 99212 OFFICE O/P EST SF 10 MIN: CPT | Performed by: NURSE PRACTITIONER

## 2020-05-28 ASSESSMENT — PAIN SCALES - GENERAL: PAINLEVEL: NO PAIN (0)

## 2020-05-28 ASSESSMENT — MIFFLIN-ST. JEOR: SCORE: 1580.18

## 2020-05-28 NOTE — PROGRESS NOTES
"  SUBJECTIVE:   Sherin Antoine is a 34 year old female who presents to clinic today for the following health issues:    HPI  Patient presents for evaluation of possible foreign object in right ear. Woke up two nights with the feeling of something crawling in her ear. She has noticed it is somewhat itchy now. But no feeling of anything moving in her ear. No pain. No discharge. No hearing changes.     There are no active problems to display for this patient.    Past Medical History:   Diagnosis Date     Personal history of other medical treatment (CODE)     No Comments Provided      Past Surgical History:   Procedure Laterality Date     OTHER SURGICAL HISTORY      [id number],NO PAST SURGERIES       Review of Systems   HENT: Negative for ear discharge and ear pain.         OBJECTIVE:     /62 (BP Location: Right arm, Cuff Size: Adult Large)   Pulse 73   Temp 98.3  F (36.8  C) (Temporal)   Resp 16   Ht 1.664 m (5' 5.5\")   Wt 86.6 kg (191 lb)   LMP 05/21/2020   SpO2 98%   BMI 31.30 kg/m    Body mass index is 31.3 kg/m .  Physical Exam  Constitutional:       Appearance: Normal appearance.   HENT:      Right Ear: Tympanic membrane normal. No drainage, swelling or tenderness. No foreign body.   Neurological:      Mental Status: She is alert.         Diagnostic Test Results:  none     ASSESSMENT/PLAN:   1. Irritation of right ear  Ear exam normal. Patient reassured no foreign object seen. No further cares needed.     [facility] AND [facility]      "

## 2020-06-01 ENCOUNTER — MYC MEDICAL ADVICE (OUTPATIENT)
Dept: FAMILY MEDICINE | Facility: OTHER | Age: 35
End: 2020-06-01

## 2020-06-15 ENCOUNTER — ALLIED HEALTH/NURSE VISIT (OUTPATIENT)
Dept: FAMILY MEDICINE | Facility: OTHER | Age: 35
End: 2020-06-15
Attending: FAMILY MEDICINE
Payer: COMMERCIAL

## 2020-06-15 DIAGNOSIS — Z01.84 IMMUNITY TO VARICELLA DETERMINED BY SEROLOGIC TEST: ICD-10-CM

## 2020-06-15 DIAGNOSIS — Z01.84 IMMUNITY TO HEPATITIS B VIRUS DEMONSTRATED BY SEROLOGIC TEST: ICD-10-CM

## 2020-06-15 DIAGNOSIS — Z11.1 SCREENING EXAMINATION FOR PULMONARY TUBERCULOSIS: Primary | ICD-10-CM

## 2020-06-15 PROCEDURE — 36415 COLL VENOUS BLD VENIPUNCTURE: CPT | Mod: ZL | Performed by: FAMILY MEDICINE

## 2020-06-15 PROCEDURE — 86580 TB INTRADERMAL TEST: CPT

## 2020-06-15 PROCEDURE — 25000125 ZZHC RX 250: Performed by: FAMILY MEDICINE

## 2020-06-15 PROCEDURE — 86787 VARICELLA-ZOSTER ANTIBODY: CPT | Mod: ZL | Performed by: FAMILY MEDICINE

## 2020-06-15 PROCEDURE — 86706 HEP B SURFACE ANTIBODY: CPT | Mod: ZL | Performed by: FAMILY MEDICINE

## 2020-06-15 RX ADMIN — TUBERCULIN PURIFIED PROTEIN DERIVATIVE 5 UNITS: 5 INJECTION, SOLUTION INTRADERMAL at 14:45

## 2020-06-17 ENCOUNTER — ALLIED HEALTH/NURSE VISIT (OUTPATIENT)
Dept: FAMILY MEDICINE | Facility: OTHER | Age: 35
End: 2020-06-17
Attending: FAMILY MEDICINE
Payer: COMMERCIAL

## 2020-06-17 DIAGNOSIS — Z11.1 SCREENING EXAMINATION FOR PULMONARY TUBERCULOSIS: Primary | ICD-10-CM

## 2020-06-17 LAB
HBV SURFACE AB SERPL IA-ACNC: 0 M[IU]/ML
VZV IGG SER QL IA: 1.8 AI (ref 0–0.8)

## 2020-06-17 PROCEDURE — 99207 ZZC NO CHARGE NURSE ONLY: CPT

## 2020-06-17 NOTE — PROGRESS NOTES
Verified patient's first and last name, and . Patient stated reason for visit today is to receive mantoux result reading. Mantoux: 0 mm induration. Negative. Patient directed to schedule second step of mantoux.       Dayna AVERYN, RN on 2020 at 3:25 PM

## 2020-07-15 ENCOUNTER — TELEPHONE (OUTPATIENT)
Dept: FAMILY MEDICINE | Facility: OTHER | Age: 35
End: 2020-07-15

## 2020-07-15 NOTE — TELEPHONE ENCOUNTER
I had a form that the patient sent to Maninder Freeman MD to fill out. I called to see what she wanted done with it.  Jacinta Mark LPN..................7/15/2020   2:46 PM

## 2020-12-27 ENCOUNTER — HEALTH MAINTENANCE LETTER (OUTPATIENT)
Age: 35
End: 2020-12-27

## 2021-03-09 ENCOUNTER — OFFICE VISIT (OUTPATIENT)
Dept: FAMILY MEDICINE | Facility: OTHER | Age: 36
End: 2021-03-09
Attending: FAMILY MEDICINE
Payer: COMMERCIAL

## 2021-03-09 VITALS
RESPIRATION RATE: 16 BRPM | BODY MASS INDEX: 31.02 KG/M2 | HEART RATE: 112 BPM | TEMPERATURE: 98.2 F | WEIGHT: 193 LBS | SYSTOLIC BLOOD PRESSURE: 138 MMHG | OXYGEN SATURATION: 99 % | DIASTOLIC BLOOD PRESSURE: 88 MMHG | HEIGHT: 66 IN

## 2021-03-09 DIAGNOSIS — Z13.220 SCREENING FOR HYPERLIPIDEMIA: ICD-10-CM

## 2021-03-09 DIAGNOSIS — Z82.49 FAMILY HISTORY OF EARLY CAD: ICD-10-CM

## 2021-03-09 DIAGNOSIS — Z13.228 SCREENING FOR METABOLIC DISORDER: ICD-10-CM

## 2021-03-09 DIAGNOSIS — R22.1 MASS OF LEFT SIDE OF NECK: ICD-10-CM

## 2021-03-09 DIAGNOSIS — Z00.00 VISIT FOR PREVENTIVE HEALTH EXAMINATION: Primary | ICD-10-CM

## 2021-03-09 DIAGNOSIS — Z13.0 SCREENING FOR DEFICIENCY ANEMIA: ICD-10-CM

## 2021-03-09 LAB
ALBUMIN SERPL-MCNC: 4.8 G/DL (ref 3.5–5.7)
ALP SERPL-CCNC: 40 U/L (ref 34–104)
ALT SERPL W P-5'-P-CCNC: 22 U/L (ref 7–52)
ANION GAP SERPL CALCULATED.3IONS-SCNC: 9 MMOL/L (ref 3–14)
AST SERPL W P-5'-P-CCNC: 20 U/L (ref 13–39)
BASOPHILS # BLD AUTO: 0 10E9/L (ref 0–0.2)
BASOPHILS NFR BLD AUTO: 0.2 %
BILIRUB SERPL-MCNC: 0.8 MG/DL (ref 0.3–1)
BUN SERPL-MCNC: 15 MG/DL (ref 7–25)
CALCIUM SERPL-MCNC: 10 MG/DL (ref 8.6–10.3)
CHLORIDE SERPL-SCNC: 103 MMOL/L (ref 98–107)
CHOLEST SERPL-MCNC: 190 MG/DL
CO2 SERPL-SCNC: 25 MMOL/L (ref 21–31)
CREAT SERPL-MCNC: 0.79 MG/DL (ref 0.6–1.2)
DIFFERENTIAL METHOD BLD: ABNORMAL
EOSINOPHIL # BLD AUTO: 0.1 10E9/L (ref 0–0.7)
EOSINOPHIL NFR BLD AUTO: 0.4 %
ERYTHROCYTE [DISTWIDTH] IN BLOOD BY AUTOMATED COUNT: 12.4 % (ref 10–15)
GFR SERPL CREATININE-BSD FRML MDRD: 83 ML/MIN/{1.73_M2}
GLUCOSE SERPL-MCNC: 100 MG/DL (ref 70–105)
HCT VFR BLD AUTO: 39.7 % (ref 35–47)
HDLC SERPL-MCNC: 50 MG/DL (ref 23–92)
HGB BLD-MCNC: 13.6 G/DL (ref 11.7–15.7)
IMM GRANULOCYTES # BLD: 0.1 10E9/L (ref 0–0.4)
IMM GRANULOCYTES NFR BLD: 0.6 %
LDLC SERPL CALC-MCNC: 115 MG/DL
LYMPHOCYTES # BLD AUTO: 2.2 10E9/L (ref 0.8–5.3)
LYMPHOCYTES NFR BLD AUTO: 18 %
MCH RBC QN AUTO: 29.7 PG (ref 26.5–33)
MCHC RBC AUTO-ENTMCNC: 34.3 G/DL (ref 31.5–36.5)
MCV RBC AUTO: 87 FL (ref 78–100)
MONOCYTES # BLD AUTO: 0.5 10E9/L (ref 0–1.3)
MONOCYTES NFR BLD AUTO: 4.5 %
NEUTROPHILS # BLD AUTO: 9.1 10E9/L (ref 1.6–8.3)
NEUTROPHILS NFR BLD AUTO: 76.3 %
NONHDLC SERPL-MCNC: 140 MG/DL
PLATELET # BLD AUTO: 296 10E9/L (ref 150–450)
POTASSIUM SERPL-SCNC: 3.6 MMOL/L (ref 3.5–5.1)
PROT SERPL-MCNC: 8 G/DL (ref 6.4–8.9)
RBC # BLD AUTO: 4.58 10E12/L (ref 3.8–5.2)
SODIUM SERPL-SCNC: 137 MMOL/L (ref 134–144)
TRIGL SERPL-MCNC: 124 MG/DL
WBC # BLD AUTO: 12 10E9/L (ref 4–11)

## 2021-03-09 PROCEDURE — 85025 COMPLETE CBC W/AUTO DIFF WBC: CPT | Mod: ZL | Performed by: FAMILY MEDICINE

## 2021-03-09 PROCEDURE — 80053 COMPREHEN METABOLIC PANEL: CPT | Mod: ZL | Performed by: FAMILY MEDICINE

## 2021-03-09 PROCEDURE — 80061 LIPID PANEL: CPT | Mod: ZL | Performed by: FAMILY MEDICINE

## 2021-03-09 PROCEDURE — 36415 COLL VENOUS BLD VENIPUNCTURE: CPT | Mod: ZL | Performed by: FAMILY MEDICINE

## 2021-03-09 PROCEDURE — 99213 OFFICE O/P EST LOW 20 MIN: CPT | Mod: 25 | Performed by: FAMILY MEDICINE

## 2021-03-09 PROCEDURE — 99395 PREV VISIT EST AGE 18-39: CPT | Performed by: FAMILY MEDICINE

## 2021-03-09 ASSESSMENT — PAIN SCALES - GENERAL: PAINLEVEL: NO PAIN (0)

## 2021-03-09 ASSESSMENT — MIFFLIN-ST. JEOR: SCORE: 1592.19

## 2021-03-09 NOTE — NURSING NOTE
"Chief Complaint   Patient presents with     Physical       Initial /88   Pulse 112   Temp 98.2  F (36.8  C) (Temporal)   Resp 16   Ht 1.676 m (5' 6\")   Wt 87.5 kg (193 lb)   LMP 02/16/2021   SpO2 99%   Breastfeeding No   BMI 31.15 kg/m   Estimated body mass index is 31.15 kg/m  as calculated from the following:    Height as of this encounter: 1.676 m (5' 6\").    Weight as of this encounter: 87.5 kg (193 lb).  Medication Reconciliation: complete    Jacinta Mark LPN  "

## 2021-03-15 ENCOUNTER — HOSPITAL ENCOUNTER (OUTPATIENT)
Dept: CT IMAGING | Facility: OTHER | Age: 36
Discharge: HOME | End: 2021-03-15
Attending: FAMILY MEDICINE | Admitting: FAMILY MEDICINE
Payer: COMMERCIAL

## 2021-03-15 ENCOUNTER — MYC MEDICAL ADVICE (OUTPATIENT)
Dept: FAMILY MEDICINE | Facility: OTHER | Age: 36
End: 2021-03-15

## 2021-03-15 DIAGNOSIS — R22.1 MASS OF LEFT SIDE OF NECK: ICD-10-CM

## 2021-03-15 PROCEDURE — 255N000002 HC RX 255 OP 636: Performed by: FAMILY MEDICINE

## 2021-03-15 PROCEDURE — 70491 CT SOFT TISSUE NECK W/DYE: CPT

## 2021-03-15 RX ADMIN — IOHEXOL 100 ML: 350 INJECTION, SOLUTION INTRAVENOUS at 08:16

## 2021-03-15 NOTE — TELEPHONE ENCOUNTER
"After birth date and last name were verified, notified of normal results per Maninder Freeman MD.  She had read the report and had questions about the \"findings\" comments.  We reviewed those and she was happy with her results.  "

## 2021-06-13 ENCOUNTER — OFFICE VISIT (OUTPATIENT)
Dept: FAMILY MEDICINE | Facility: OTHER | Age: 36
End: 2021-06-13
Attending: FAMILY MEDICINE
Payer: COMMERCIAL

## 2021-06-13 VITALS
SYSTOLIC BLOOD PRESSURE: 120 MMHG | HEIGHT: 65 IN | HEART RATE: 83 BPM | DIASTOLIC BLOOD PRESSURE: 70 MMHG | RESPIRATION RATE: 18 BRPM | OXYGEN SATURATION: 98 % | WEIGHT: 183 LBS | TEMPERATURE: 98.3 F | BODY MASS INDEX: 30.49 KG/M2

## 2021-06-13 DIAGNOSIS — H92.03 OTALGIA OF BOTH EARS: Primary | ICD-10-CM

## 2021-06-13 PROCEDURE — 99212 OFFICE O/P EST SF 10 MIN: CPT | Performed by: NURSE PRACTITIONER

## 2021-06-13 ASSESSMENT — PAIN SCALES - GENERAL: PAINLEVEL: MODERATE PAIN (4)

## 2021-06-13 ASSESSMENT — MIFFLIN-ST. JEOR: SCORE: 1530.96

## 2021-06-13 NOTE — NURSING NOTE
"Chief Complaint   Patient presents with     Ear Problem     patient presneted to the clinic with bilateral ear pain that started over the last few days and she is also having vertigo over the last week.     Initial /70 (BP Location: Right arm, Patient Position: Sitting, Cuff Size: Adult Large)   Pulse 83   Temp 98.3  F (36.8  C) (Tympanic)   Resp 18   Ht 1.651 m (5' 5\")   Wt 83 kg (183 lb)   LMP 06/13/2021   SpO2 98%   Breastfeeding No   BMI 30.45 kg/m   Estimated body mass index is 30.45 kg/m  as calculated from the following:    Height as of this encounter: 1.651 m (5' 5\").    Weight as of this encounter: 83 kg (183 lb).         Medication Reconciliation: Complete      Aminta Rod LPN   "

## 2021-06-13 NOTE — PROGRESS NOTES
ASSESSMENT/PLAN:    I have reviewed the nursing notes.  I have reviewed the findings, diagnosis, plan and need for follow up with the patient.    Ear pain:   -Mild effusion present to left middle ear without signs of infection. I suspect this is related to recent viral URI she had 3-4 weeks ago.   -Informed patient this can take up to 12 weeks to resolve spontaneously. She was receptive to this.   -No antibiotics are indicated      May use over-the-counter Tylenol or ibuprofen PRN    Discussed warning signs/symptoms indicative of need to f/u    Follow up if symptoms persist or worsen or concerns    I explained my diagnostic considerations and recommendations to the patient, who voiced understanding and agreement with the treatment plan. All questions were answered. We discussed potential side effects of any prescribed or recommended therapies, as well as expectations for response to treatments.    Caitlin Meneses NP  6/13/2021  2:38 PM    HPI:  Sherin Antoine is a 35 year old female who presents to [facility] today for bilateral ear pain x1 week. Goes swimming often.  She denies hearing loss and ringing in her ear. Occasionally feels crackling or popping in her ears. Left ear worse than right. Has had a significant history of ear problems but it has been years since she has had an infection. Reports some dizziness including nausea intermittently with  the ear discomfort x 1-2 weeks. No known fevers or chills. Some sinus symptoms drainage lingering. States she had a cold a few weeks back that has not quite fully resolved. Is vaccinated for covid. Recently traveled to [place]. Otherwise negative review of systems.        Past Medical History:   Diagnosis Date     Personal history of other medical treatment (CODE)     No Comments Provided     Past Surgical History:   Procedure Laterality Date     OTHER SURGICAL HISTORY      [id number],NO PAST SURGERIES     Social History     Tobacco Use     Smoking status: Never Smoker  "    Smokeless tobacco: Never Used   Substance Use Topics     Alcohol use: No     Alcohol/week: 0.0 standard drinks     No current outpatient medications on file.     No Known Allergies  Past medical history, past surgical history, current medications and allergies reviewed and accurate to the best of my knowledge.      ROS:  Refer to HPI    /70 (BP Location: Right arm, Patient Position: Sitting, Cuff Size: Adult Large)   Pulse 83   Temp 98.3  F (36.8  C) (Tympanic)   Resp 18   Ht 1.651 m (5' 5\")   Wt 83 kg (183 lb)   LMP 06/13/2021   SpO2 98%   Breastfeeding No   BMI 30.45 kg/m      EXAM:  General Appearance: Well appearing 35 year old female, appropriate appearance for age. No acute distress  Ears: Left TM intact, translucent with bony landmarks appreciated, mild effusion, no purulence.  Right TM intact, translucent with bony landmarks appreciated, no erythema, no effusion, no bulging, no purulence.  Left auditory canal clear.  Right auditory canal clear.  Normal external ears, non tender.  Eyes: conjunctivae normal without erythema or irritation, corneas clear, no drainage or crusting, no eyelid swelling, pupils equal   Orophayrnx: moist mucous membranes, posterior pharynx without erythema, tonsils without hypertrophy, no erythema, no exudates or petechiae, no post nasal drip seen, no trismus, voice clear.    Sinuses:  No sinus tenderness upon palpation of the frontal or maxillary sinuses  Nose:  Bilateral nares: no erythema, no edema, no drainage or congestion   Neck: supple without adenopathy  Respiratory: normal chest wall and respirations.  Normal effort.  Clear to auscultation bilaterally, no wheezing, crackles or rhonchi.  No increased work of breathing.  No cough appreciated.  Cardiac: RRR with no murmurs  Musculoskeletal:  Equal movement of bilateral upper extremities.  Equal movement of bilateral lower extremities.  Normal gait.    Dermatological: no rashes noted of exposed " skin  Psychological: normal affect, alert, oriented, and pleasant.

## 2021-06-13 NOTE — PATIENT INSTRUCTIONS
Reassured that ears are not infected. There is mild to moderate amount of clear fluid in the left ear but none in the right. This may take up to 12 weeks to resolve spontaneously. It may be result of your recent upper respiratory cold symptoms.     No medications were prescribed today.     Follow up if symptoms persist beyond 12 weeks or worsen.

## 2021-06-30 ENCOUNTER — TELEPHONE (OUTPATIENT)
Dept: FAMILY MEDICINE | Facility: OTHER | Age: 36
End: 2021-06-30

## 2021-06-30 ENCOUNTER — OFFICE VISIT (OUTPATIENT)
Dept: FAMILY MEDICINE | Facility: OTHER | Age: 36
End: 2021-06-30
Attending: PHYSICIAN ASSISTANT
Payer: COMMERCIAL

## 2021-06-30 VITALS
WEIGHT: 181.13 LBS | BODY MASS INDEX: 30.18 KG/M2 | TEMPERATURE: 97.8 F | HEIGHT: 65 IN | SYSTOLIC BLOOD PRESSURE: 124 MMHG | RESPIRATION RATE: 20 BRPM | OXYGEN SATURATION: 99 % | HEART RATE: 91 BPM | DIASTOLIC BLOOD PRESSURE: 70 MMHG

## 2021-06-30 DIAGNOSIS — R07.0 THROAT PAIN: Primary | ICD-10-CM

## 2021-06-30 LAB
SPECIMEN SOURCE: NORMAL
STREP GROUP A PCR: NOT DETECTED

## 2021-06-30 PROCEDURE — 87651 STREP A DNA AMP PROBE: CPT | Mod: ZL | Performed by: NURSE PRACTITIONER

## 2021-06-30 PROCEDURE — 99213 OFFICE O/P EST LOW 20 MIN: CPT | Performed by: NURSE PRACTITIONER

## 2021-06-30 ASSESSMENT — PAIN SCALES - GENERAL: PAINLEVEL: MODERATE PAIN (4)

## 2021-06-30 ASSESSMENT — MIFFLIN-ST. JEOR: SCORE: 1517.46

## 2021-06-30 NOTE — PROGRESS NOTES
ASSESSMENT/PLAN:  1. Throat pain    - Group A Streptococcus PCR Throat Swab    Strep PCR negative.     Patient declined COVID testing at this time.     Discussed with the patient that her symptoms are most likely related to a viral upper respiratory illness. The patient was instructed to follow up if her symptoms persist or worsen.     Symptomatic treatment - Encouraged fluids, salt water gargles, honey, humidifier,  lozenges, etc     May use over-the-counter Tylenol or ibuprofen PRN    Discussed warning signs/symptoms indicative of need to f/u    Follow up if symptoms persist or worsen or concerns      I explained my diagnostic considerations and recommendations to the patient, who voiced understanding and agreement with the treatment plan. All questions were answered. We discussed potential side effects of any prescribed or recommended therapies, as well as expectations for response to treatments.        HPI:    Sherin Antoine is a 36 year old female  who presents to [facility] today for  coughing up mucous intermittently, sore throat, erythema with white patches ot bilateral tonsils. The patient was recently at a family gathering. She denies any known sick contacts. The patient reports that she has been having a sore neck. Rating her pain 4/10. Denies fevers. Denies taking any OTC medication for her symptoms. The patient states that she had a cough from a viral infection at the end of may. The patient states that the cough had resolved and now she has had the intermittent cough with mucous production recently.     Past Medical History:   Diagnosis Date     Personal history of other medical treatment (CODE)     No Comments Provided     Past Surgical History:   Procedure Laterality Date     OTHER SURGICAL HISTORY      [id number],NO PAST SURGERIES     Social History     Tobacco Use     Smoking status: Never Smoker     Smokeless tobacco: Never Used   Substance Use Topics     Alcohol use: No     Alcohol/week: 0.0  "standard drinks     No current outpatient medications on file.     No Known Allergies      Past medical history, past surgical history, current medications and allergies reviewed and accurate to the best of my knowledge.        ROS:  Refer to HPI    /70 (BP Location: Right arm, Patient Position: Sitting, Cuff Size: Adult Regular)   Pulse 91   Temp 97.8  F (36.6  C) (Tympanic)   Resp 20   Ht 1.651 m (5' 5\")   Wt 82.2 kg (181 lb 2 oz)   LMP 06/13/2021   SpO2 99%   Breastfeeding No   BMI 30.14 kg/m      EXAM:  General Appearance: Well appearing female, appropriate appearance for age. No acute distress  Ears: Left TM intact, translucent with bony landmarks appreciated, no erythema, no effusion, no bulging, no purulence.  Right TM intact, translucent with bony landmarks appreciated, no erythema, no effusion, no bulging, no purulence.  Left auditory canal clear.  Right auditory canal clear.  Normal external ears, non tender.  Orophayrnx: moist mucous membranes, posterior pharynx with erythema, tonsils without hypertrophy, no erythema, white exudates seen to right tonsil or petechiae, no post nasal drip seen, no trismus, voice clear.    Neck: supple without adenopathy  Respiratory: normal chest wall and respirations.  Normal effort.  Clear to auscultation bilaterally, no wheezing, crackles or rhonchi.  No increased work of breathing.  No cough appreciated.  Cardiac: RRR with no murmurs  Psychological: normal affect, alert, oriented, and pleasant.       Labs:  Results for orders placed or performed in visit on 06/30/21   Group A Streptococcus PCR Throat Swab     Status: None    Specimen: Throat   Result Value Ref Range    Specimen Description Throat     Strep Group A PCR Not Detected NDET^Not Detected                 "

## 2021-06-30 NOTE — PATIENT INSTRUCTIONS
Patient Education     Self-Care for Sore Throats     Sore throats happen for many reasons, such as colds, allergies, cigarette smoke, air pollution, and infections caused by viruses or bacteria. In any case, your throat becomes red and sore. Your goal for self-care is to reduce your discomfort while giving your throat a chance to heal.  Moisten and soothe your throat  Tips include the following:    Try a sip of water first thing after waking up.    Keep your throat moist by drinking 6 or more glasses of clear liquids every day.    Run a cool-air humidifier in your room overnight.    Stay away from cigarette smoke.     Check the air quality index,if air pollution gives you a sore throat. On high pollution days, try to limit outdoor time.    Suck on throat lozenges, cough drops, hard candy, ice chips, or frozen fruit-juice bars. Use the sugar-free versions if your diet or medical condition requires them.  Gargle to ease irritation  Gargling every hour or 2 can ease irritation. Try gargling with 1 of these solutions:    1/4 teaspoon of salt in 1/2 cup of warm water    An over-the-counter anesthetic gargle  Use medicine for more relief  Over-the-counter medicine can reduce sore throat symptoms. Ask your pharmacist if you have questions about which medicine to use. To prevent possible medicine interactions, let the pharmacist know what medicines you take. To decrease symptoms:    Ease pain with anesthetic sprays. Aspirin or an aspirin substitute also helps. Remember, never give aspirin to anyone 18 or younger. Don't take aspirin if you are already taking blood thinners.     For sore throats caused by allergies, try antihistamines to block the allergic reaction.  Unless a sore throat is caused by a bacterial infection, antibiotics won t help you.  Prevent future sore throats  Prevention tips include:    Stop smoking or reduce contact with secondhand smoke. Smoke irritates the tender throat lining.    Limit contact with  pets and with allergy-causing substances, such as pollen and mold.    Wash your hands often when you re around someone with a sore throat or cold. This will keep viruses or bacteria from spreading.    Limit outdoor time when air pollution is bad.    Don t strain your vocal cords.  When to call your healthcare provider  Contact your healthcare provider if you have:    Fever of 100.4 F (38.0 C) or higher, or as directed by your healthcare provider    White spots on the throat    Great Trouble swallowing    A skin rash    Recent exposure to someone else with strep bacteria    Severe hoarseness and swollen glands in the neck or jaw  Call 911  Call 911 if any of the following occur:    Trouble breathing or catching your breath    Drooling and problems swallowing    Wheezing    Unable to talk    Feeling dizzy or faint    Feeling of doom  Malik last reviewed this educational content on 9/1/2019 2000-2021 [employer]. All rights reserved. This information is not intended as a substitute for professional medical care. Always follow your healthcare professional's instructions.

## 2021-06-30 NOTE — NURSING NOTE
Patient presents to clinic experiencing sore throat, sinus drainage, productive cough and headaches.  Medication Reconciliation: complete    Verna Osorio LPN

## 2021-06-30 NOTE — TELEPHONE ENCOUNTER
Patient got a message she has a new test results on [facility] but can't view the results. She is wondering if someone could call her and give her the results?

## 2021-06-30 NOTE — TELEPHONE ENCOUNTER
Called patient and verified full name and date of birth.  Informed patient of the following lab result.  Negative strep result. Continue with symptomatic treatment. 'Follow up if symptoms persist. Negative strep result. Continue with symptomatic treatment. Follow up if symptoms persist. '  She verbalized understanding and had no further questions.  Pau Gomes LPN on 6/30/2021 at 1:49 PM

## 2021-08-04 ENCOUNTER — OFFICE VISIT (OUTPATIENT)
Dept: FAMILY MEDICINE | Facility: OTHER | Age: 36
End: 2021-08-04
Attending: FAMILY MEDICINE
Payer: COMMERCIAL

## 2021-08-04 VITALS
SYSTOLIC BLOOD PRESSURE: 128 MMHG | DIASTOLIC BLOOD PRESSURE: 82 MMHG | RESPIRATION RATE: 16 BRPM | TEMPERATURE: 97.9 F | BODY MASS INDEX: 30.49 KG/M2 | WEIGHT: 183.2 LBS | OXYGEN SATURATION: 98 % | HEART RATE: 96 BPM

## 2021-08-04 DIAGNOSIS — R00.2 PALPITATIONS: ICD-10-CM

## 2021-08-04 DIAGNOSIS — F41.9 ANXIETY: Primary | ICD-10-CM

## 2021-08-04 PROCEDURE — 99215 OFFICE O/P EST HI 40 MIN: CPT | Performed by: FAMILY MEDICINE

## 2021-08-04 ASSESSMENT — ANXIETY QUESTIONNAIRES
GAD7 TOTAL SCORE: 8
6. BECOMING EASILY ANNOYED OR IRRITABLE: NOT AT ALL
7. FEELING AFRAID AS IF SOMETHING AWFUL MIGHT HAPPEN: MORE THAN HALF THE DAYS
7. FEELING AFRAID AS IF SOMETHING AWFUL MIGHT HAPPEN: MORE THAN HALF THE DAYS
3. WORRYING TOO MUCH ABOUT DIFFERENT THINGS: MORE THAN HALF THE DAYS
4. TROUBLE RELAXING: SEVERAL DAYS
8. IF YOU CHECKED OFF ANY PROBLEMS, HOW DIFFICULT HAVE THESE MADE IT FOR YOU TO DO YOUR WORK, TAKE CARE OF THINGS AT HOME, OR GET ALONG WITH OTHER PEOPLE?: SOMEWHAT DIFFICULT
2. NOT BEING ABLE TO STOP OR CONTROL WORRYING: SEVERAL DAYS
GAD7 TOTAL SCORE: 8
GAD7 TOTAL SCORE: 8
5. BEING SO RESTLESS THAT IT IS HARD TO SIT STILL: NOT AT ALL
1. FEELING NERVOUS, ANXIOUS, OR ON EDGE: MORE THAN HALF THE DAYS

## 2021-08-04 ASSESSMENT — PATIENT HEALTH QUESTIONNAIRE - PHQ9
SUM OF ALL RESPONSES TO PHQ QUESTIONS 1-9: 0
10. IF YOU CHECKED OFF ANY PROBLEMS, HOW DIFFICULT HAVE THESE PROBLEMS MADE IT FOR YOU TO DO YOUR WORK, TAKE CARE OF THINGS AT HOME, OR GET ALONG WITH OTHER PEOPLE: NOT DIFFICULT AT ALL
SUM OF ALL RESPONSES TO PHQ QUESTIONS 1-9: 0

## 2021-08-04 ASSESSMENT — PAIN SCALES - GENERAL: PAINLEVEL: NO PAIN (0)

## 2021-08-04 NOTE — PROGRESS NOTES
"Nursing Notes:   Minoo Guzman LPN  8/4/2021  3:55 PM  Signed  Chief Complaint   Patient presents with     Follow Up     sore throat, was seen in [facility] 6/30/21, still coughing up phlem since May 2021     Anxiety     Patient presents to clinic today for follow up from [facility] - seen 6/30/21. She was seen for a sore throat and coughing up phlegm. She is still coughing up phlegm since May 2021. She also has a couple of questions about anxiety.      Initial /82 (BP Location: Right arm, Patient Position: Sitting, Cuff Size: Adult Regular)   Pulse 96   Temp 97.9  F (36.6  C) (Tympanic)   Resp 16   Wt 83.1 kg (183 lb 3.2 oz)   SpO2 98%   BMI 30.49 kg/m   Estimated body mass index is 30.49 kg/m  as calculated from the following:    Height as of 6/30/21: 1.651 m (5' 5\").    Weight as of this encounter: 83.1 kg (183 lb 3.2 oz).     FOOD SECURITY SCREENING QUESTIONS  Hunger Vital Signs:  Within the past 12 months we worried whether our food would run out before we got money to buy more. Never  Within the past 12 months the food we bought just didn't last and we didn't have money to get more. Never      Medication Reconciliation: Complete      Minoo Guzman LPN       SUBJECTIVE:  Sherin Antoine  is a 36 year old female who comes in today for follow-up of anxiety.  When we saw her back in March, they had lost several family members and were having a lot of difficult decisions to make around whether or not she was going to go to pharmacy school etc.  We elected to employ expectant management did not treat her with any medication.    PHQ 5/5/2017 12/8/2017 8/4/2021   PHQ-9 Total Score 0 0 0   Q9: Thoughts of better off dead/self-harm past 2 weeks Not at all Not at all Not at all     JAVIER-7 SCORE 8/4/2021   Total Score 8 (mild anxiety)   Total Score 8       She had a cough productive of some sputum.  She is just now over another cold. She has never had to use an inhaler. Her mom has asthma and her son " does.  She will have chest tightness and heaviness. She at times will have some palpitations.  It may be anxiety related. It happens more at night.  She had been drinking a tea supplement with a lot of caffeine.  She stopped that and now her symptoms seem to be a little bit better.      Past Medical, Family, and Social History reviewed and updated as noted below.   ROS is negative except as noted above       No Known Allergies,   Family History   Problem Relation Age of Onset     Myocardial Infarction Father 32     Other - See Comments Mother         chronic back pain     Cerebrovascular Disease Maternal Grandmother      Heart Disease Paternal Aunt      Heart Disease Paternal Uncle    ,   No current outpatient medications on file.     No current facility-administered medications for this visit.   ,   Past Medical History:   Diagnosis Date     Personal history of other medical treatment (CODE)     No Comments Provided   ,   Patient Active Problem List    Diagnosis Date Noted     Family history of early CAD 03/09/2021     Priority: Medium   ,   Past Surgical History:   Procedure Laterality Date     OTHER SURGICAL HISTORY      [id number],NO PAST SURGERIES    and   Social History     Tobacco Use     Smoking status: Never Smoker     Smokeless tobacco: Never Used   Substance Use Topics     Alcohol use: No     Alcohol/week: 0.0 standard drinks     OBJECTIVE:  /82 (BP Location: Right arm, Patient Position: Sitting, Cuff Size: Adult Regular)   Pulse 96   Temp 97.9  F (36.6  C) (Tympanic)   Resp 16   Wt 83.1 kg (183 lb 3.2 oz)   SpO2 98%   BMI 30.49 kg/m     EXAM:  Alert cooperative, no distress.  HEENT: Eyes PERRLA, EOMI, conjunctiva sclerae normal.  Mucous membranes are moist.  TMs are clear.  Throat is clear.  Neck is supple without significant adenopathy.  No thyromegaly, no audible bruits.  Lungs are clear, no rales rhonchi or wheezes are heard.  Cardiac RRR without murmur.  ASSESSMENT/Plan :    Sherin was seen  today for follow up and anxiety.    Diagnoses and all orders for this visit:    Anxiety    Palpitations      Lengthy discussion with regard to her symptoms.  Since cutting out the caffeine supplement, her anxiety is gotten better.  We discussed over-the-counter medication avoidance and caffeine avoidance.  She seems to have a good handle on being able to do self talk with management of her anxiety.  Discussed pathophysiology of anxiety and the role that serotonin plays.  She would likely be a good candidate for an SSRI with a rescue medicine but at this point she is not interested in trying a medication but would think about it going forward.  We discussed her palpitations and she seemed reassured.  Offered to check her thyroid do an echo and a Zio patch and she wishes to hold off at this time.  She feels she is doing better and just needed some reassurance.  She will keep in touch with her progress and let us know if she changes her mind.    A total of 47 minutes was spent with the patient, reviewing records, tests, ordering medications, tests or procedures and documenting clinical information in the EHR.     Maninder Freeman MD

## 2021-08-04 NOTE — NURSING NOTE
"Chief Complaint   Patient presents with     Follow Up     sore throat, was seen in [facility] 6/30/21, still coughing up phlem since May 2021     Anxiety     Patient presents to clinic today for follow up from [facility] - seen 6/30/21. She was seen for a sore throat and coughing up phlegm. She is still coughing up phlegm since May 2021. She also has a couple of questions about anxiety.      Initial /82 (BP Location: Right arm, Patient Position: Sitting, Cuff Size: Adult Regular)   Pulse 96   Temp 97.9  F (36.6  C) (Tympanic)   Resp 16   Wt 83.1 kg (183 lb 3.2 oz)   SpO2 98%   BMI 30.49 kg/m   Estimated body mass index is 30.49 kg/m  as calculated from the following:    Height as of 6/30/21: 1.651 m (5' 5\").    Weight as of this encounter: 83.1 kg (183 lb 3.2 oz).     FOOD SECURITY SCREENING QUESTIONS  Hunger Vital Signs:  Within the past 12 months we worried whether our food would run out before we got money to buy more. Never  Within the past 12 months the food we bought just didn't last and we didn't have money to get more. Never      Medication Reconciliation: Complete      Minoo Guzman LPN   "

## 2021-08-05 ASSESSMENT — PATIENT HEALTH QUESTIONNAIRE - PHQ9: SUM OF ALL RESPONSES TO PHQ QUESTIONS 1-9: 0

## 2021-08-05 ASSESSMENT — ANXIETY QUESTIONNAIRES: GAD7 TOTAL SCORE: 8

## 2021-10-09 ENCOUNTER — HEALTH MAINTENANCE LETTER (OUTPATIENT)
Age: 36
End: 2021-10-09

## 2021-10-20 ENCOUNTER — ALLIED HEALTH/NURSE VISIT (OUTPATIENT)
Dept: FAMILY MEDICINE | Facility: OTHER | Age: 36
End: 2021-10-20
Attending: FAMILY MEDICINE
Payer: COMMERCIAL

## 2021-10-20 DIAGNOSIS — Z20.822 COVID-19 RULED OUT: ICD-10-CM

## 2021-10-20 DIAGNOSIS — Z20.822 EXPOSURE TO 2019 NOVEL CORONAVIRUS: Primary | ICD-10-CM

## 2021-10-20 PROCEDURE — U0005 INFEC AGEN DETEC AMPLI PROBE: HCPCS | Mod: ZL

## 2021-10-20 PROCEDURE — C9803 HOPD COVID-19 SPEC COLLECT: HCPCS

## 2021-10-20 NOTE — PROGRESS NOTES
Patient here for COVID testing.   No smell    Sejal Kay RN on 10/20/2021 at 3:28 PM    [facility] Discharge Instructions:    Prince Menendez is a 2 day old  infant, delivered at Gestational Age: 39w6d.    discharged to home, accompanied by mom and dad.    If you have any questions about your baby, please call your baby's doctor    Do not give your baby any medications unless directed by your Pediatrician    Call the doctor if:  · Fever of 100 degrees F or above  · Forceful vomiting (not spitting up)  · Several feedings when infant does not suck  · Watery, runny stools (with mucous, blood or foul odor)  · Infant injury (fall from bed or table, dropped or severely shaken, or any other injuries)  · Constant crying  · Any unusual rash  · Yellow color of the skin or eyes  · Has less than 4 wet diapers in a 24 hour period of time in the first week of life, or less than 6 wet diapers in a 24 hour period after the baby is 7 days old  · No stool (bowel movement) for 48 hours  · Redness, drainage or foul odor from the umbilical cord and/or circumcision site      Special instructions: In case you need to call the doctor about any of the above:    · Take baby's temperature and write it down  · Know how much and how many feedings the baby has had that day  · Note amount, color and consistency of urine and stools  · Note any changes in the infants behavior such as being very sleepy, very fussy or less active      Date and time of Delivery: 2018  5:57 AM     Delivery Method: Vaginal, Spontaneous [250]        APGARS  One minute Five minutes   Skin color: 0  1    Heart rate: 2  2     Reflex: 2  2    Muscle tone: 2  2    Breathin  2    Totals:   8  9        Feeding method: Natural Human Milk  Last time baby ate: Breast (19)  Last wet diaper: 1 (19)  Last stool: 1 (19 014)    Bilirubin No results found for: BILIRUBIN    Screen done: Done   Immunizations:   Most Recent Immunizations   Administered Date(s) Administered   • Hep B, adolescent or pediatric 2019      [facility]  Hearing Test Machine: Auditory Brainstem Response (Algo) (19)  [place] Hearing Test Results: Pass R;Pass L (19)    Follow up with Audiology: Not needed  Birth Weight: 8 lb 11 oz (3941 g)    Discharge weight: Weight: 3650 g  Discharge Date: 2019    Tummy Time:  Babies need 30- 60 minutes of SUPERVISED tummy time every day. Skin-to-skin contact is included in this!    Help after you leave the hospital:    Ideas for help at home: friends, family members, neighbors, and members of your carly community are all there to help you.    [facility] Lactation Services (Breastfeeding help):[phone]      Special Instructions: see attachments     Reviewed with parent by: Cristina      Additional Information:   Discharge Instructions: When Your Baby Cries  The way your baby cries can tell you how the baby is feeling. It can also alert you to the baby’s needs. This sheet will help you understand what it means when your baby cries, and what you can do to help.  First try holding the baby to see if the crying stops. If it doesn’t, walking together may help soothe your baby.  Crying  It’s normal for babies to cry. Sometimes the baby just wants to be held. But if the crying doesn’t stop, look for a cause. Common causes of crying include:  · Hunger  · Discomfort (such as a wet diaper, clothes that are too tight, feeling too hot or too cold, or gas pains)  · A stuffy nose, which can make it hard for the baby to breathe  · Stress or overstimulation (especially common in preemies)  · Illness  What to Do When Your Baby Cries  Crying can be the baby’s way of telling you there’s a problem. The baby trusts you to respond to crying and fix whatever is causing the problem. Figuring out what’s wrong may take some guesswork from you. If holding the baby doesn’t help, here are some other things you can try:  · Try feeding, in case the baby is hungry. To help prevent gas pains, burp the baby about every 5  minutes while feeding. Also keep the baby’s head higher than the rest of the body while feeding.  · Check the baby’s diaper. Change it if needed.  · Give the baby a warm bath. Or, hold a damp, warm towel on the baby’s stomach for a little while. This may calm some babies.   · Rock or walk with the baby. Motion is soothing.  · Wrap the baby snugly in a blanket. This is called swaddling. It makes the baby feel safe and secure. (See the box later on this sheet to learn how to swaddle your baby.) A baby who is old enough to roll over (about 3 months) should never be left swaddled and unattended. This could be dangerous if the baby rolls onto his or her stomach.    · Hold the baby against your bare chest. Skin-to-skin contact can be comforting to the baby.  · If the baby has a stuffy nose, use a bulb syringe to clear it. (Your baby’s doctor or nurse can show you how to do this.)  · Check for signs of illness, such as fever or diarrhea. If the baby seems sick, call the doctor.  · Fever:  ¨ In an infant under 3 months old, a rectal temperature of 100.4°F (38ºC) or higher  ¨ In a child of any age who has a temperature that rises repeatedly to 104°F (40ºC) or higher  ¨ A fever that lasts more than 24 hours in a child under 2 years old or for 3 days in a child 2 years or older.  ¨ Your child looks very ill, is unusually sleepy, or is very fussy   ¨ Your child has had a seizure  How to Swaddle Your Baby  Wrapping your baby securely in a blanket (swaddling) helps the baby feel warm and safe. Here is one method:  · Fold a square blanket diagonally to make a triangle. Turn the triangle so the flat base is at the top and the point is at the bottom.  · Lay the baby on top of the blanket with the head over the straight base of the triangle and the feet toward the point.  · Pull one side of the triangle all the way over the baby’s torso and tuck it under the baby’s body (Figure 1). A baby is most comfortable with the arms folded over  the chest. You can pull the blanket over the baby’s arms to keep them contained. Or, you can leave one arm free so the baby can suck on its fingers.  · Bring the bottom of the blanket loosely over the baby’s feet and all the way up to the neck (Figure 2). It is very important to keep the baby's feet and legs free to move. Tight swaddling is associated with a condition called hip dysplasia. If your baby has hip dysplasia, do not swaddle.   · Wrap the other side of the triangle across the baby’s chest (Figure 3).  · After your baby is swaddled, check often for the following:  ¨ The blanket stays secure. A loose blanket can cover the baby’s face and cause suffocation.  ¨ The baby is not overheated. If your baby is hot, remove the blanket and try using a lighter blanket or sheet for swaddling.        © 0445-1998 [employer]. [address] [place] [address], [place] [address]. All rights reserved. This information is not intended as a substitute for professional medical care. Always follow your healthcare professional's instructions.        Discharge Instructions: Preventing Shaken Baby Syndrome  Shaking a baby, even slightly, is very dangerous. It causes a serious problem called shaken baby syndrome. This can lead to major brain damage and death. When a baby won’t stop crying, it can be frustrating. The stress of caring for a baby, especially if your baby has been sick, puts a strain on the parents. But no matter how fed up, tired, or upset you are, you should NEVER shake your baby.       If a baby is shaken, the brain can hit the inside of the skull.   Why it’s a problem  When a baby is shaken, the brain moves back and forth inside the skull. Even a little force could cause the brain to hit the inside of the skull. This can result in bleeding and swelling inside the skull. It can lead to permanent brain damage, coma, or death.  If you’re frustrated  If you feel yourself getting fed up, here’s how to cope:  · Put  the baby down in a safe place, even if the baby is crying.  · Take a deep breath. Walk away. Count to 10. Do whatever else you need to do to calm down.  · Let others help you take care of the baby. Trade off with your partner, the baby’s grandparents, or other family members.  · Talk with your baby’s doctor about what’s causing the crying. There could be a health problem or other issue that’s making the baby cry more than normal. The doctor can also give you ideas for how to console your crying baby.  · If your baby’s doctor believes your baby is just fussy, know that this is not your fault. Your baby will grow out of this period of fussiness. It does not mean the baby does not love you, or that you are not doing a good job.  · If you’re feeling overwhelmed, talk with your baby’s doctor about  options, counseling, or other resources that can help.  · Call the [facility] Child Abuse Hotline at 190-770-0077. The trained  can help you deal with your frustration, so you don’t hurt your baby.    © 4152-8460 [employer]. [address] [place] [address], [place] [address]. All rights reserved. This information is not intended as a substitute for professional medical care. Always follow your healthcare professional's instructions.

## 2021-10-22 LAB — SARS-COV-2 RNA RESP QL NAA+PROBE: POSITIVE

## 2022-02-09 ENCOUNTER — ALLIED HEALTH/NURSE VISIT (OUTPATIENT)
Dept: FAMILY MEDICINE | Facility: OTHER | Age: 37
End: 2022-02-09
Attending: FAMILY MEDICINE
Payer: COMMERCIAL

## 2022-02-09 DIAGNOSIS — Z20.822 COVID-19 RULED OUT: Primary | ICD-10-CM

## 2022-02-09 PROCEDURE — U0005 INFEC AGEN DETEC AMPLI PROBE: HCPCS | Mod: ZL

## 2022-02-09 PROCEDURE — C9803 HOPD COVID-19 SPEC COLLECT: HCPCS

## 2022-02-09 NOTE — PROGRESS NOTES
Patient swabbed for COVID-19 testing. Getting tested for an event   Nataly De La Cruz RN on 2/9/2022 at 9:02 AM     Skin normal color for race, warm, dry and intact. No evidence of rash.

## 2022-02-10 LAB — SARS-COV-2 RNA RESP QL NAA+PROBE: NEGATIVE

## 2022-03-23 ENCOUNTER — OFFICE VISIT (OUTPATIENT)
Dept: FAMILY MEDICINE | Facility: OTHER | Age: 37
End: 2022-03-23
Attending: FAMILY MEDICINE
Payer: COMMERCIAL

## 2022-03-23 VITALS
TEMPERATURE: 97.5 F | OXYGEN SATURATION: 96 % | HEART RATE: 85 BPM | DIASTOLIC BLOOD PRESSURE: 72 MMHG | SYSTOLIC BLOOD PRESSURE: 130 MMHG | WEIGHT: 195 LBS | BODY MASS INDEX: 31.34 KG/M2 | RESPIRATION RATE: 16 BRPM | HEIGHT: 66 IN

## 2022-03-23 DIAGNOSIS — Z12.4 SCREENING FOR MALIGNANT NEOPLASM OF CERVIX: ICD-10-CM

## 2022-03-23 DIAGNOSIS — Z13.228 SCREENING FOR METABOLIC DISORDER: ICD-10-CM

## 2022-03-23 DIAGNOSIS — R00.2 PALPITATIONS: ICD-10-CM

## 2022-03-23 DIAGNOSIS — Z00.00 VISIT FOR PREVENTIVE HEALTH EXAMINATION: Primary | ICD-10-CM

## 2022-03-23 DIAGNOSIS — Z13.0 SCREENING FOR DEFICIENCY ANEMIA: ICD-10-CM

## 2022-03-23 DIAGNOSIS — Z13.29 SCREENING FOR THYROID DISORDER: ICD-10-CM

## 2022-03-23 LAB
ALBUMIN SERPL-MCNC: 4.5 G/DL (ref 3.5–5.7)
ALP SERPL-CCNC: 43 U/L (ref 34–104)
ALT SERPL W P-5'-P-CCNC: 18 U/L (ref 7–52)
ANION GAP SERPL CALCULATED.3IONS-SCNC: 6 MMOL/L (ref 3–14)
AST SERPL W P-5'-P-CCNC: 18 U/L (ref 13–39)
BASOPHILS # BLD AUTO: 0 10E3/UL (ref 0–0.2)
BASOPHILS NFR BLD AUTO: 0 %
BILIRUB SERPL-MCNC: 1 MG/DL (ref 0.3–1)
BUN SERPL-MCNC: 14 MG/DL (ref 7–25)
CALCIUM SERPL-MCNC: 10 MG/DL (ref 8.6–10.3)
CHLORIDE BLD-SCNC: 104 MMOL/L (ref 98–107)
CO2 SERPL-SCNC: 28 MMOL/L (ref 21–31)
CREAT SERPL-MCNC: 0.8 MG/DL (ref 0.6–1.2)
EOSINOPHIL # BLD AUTO: 0.1 10E3/UL (ref 0–0.7)
EOSINOPHIL NFR BLD AUTO: 1 %
ERYTHROCYTE [DISTWIDTH] IN BLOOD BY AUTOMATED COUNT: 12.3 % (ref 10–15)
GFR SERPL CREATININE-BSD FRML MDRD: >90 ML/MIN/1.73M2
GLUCOSE BLD-MCNC: 95 MG/DL (ref 70–105)
HCT VFR BLD AUTO: 38.5 % (ref 35–47)
HGB BLD-MCNC: 13.1 G/DL (ref 11.7–15.7)
IMM GRANULOCYTES # BLD: 0 10E3/UL
IMM GRANULOCYTES NFR BLD: 0 %
LYMPHOCYTES # BLD AUTO: 1.9 10E3/UL (ref 0.8–5.3)
LYMPHOCYTES NFR BLD AUTO: 20 %
MCH RBC QN AUTO: 29.8 PG (ref 26.5–33)
MCHC RBC AUTO-ENTMCNC: 34 G/DL (ref 31.5–36.5)
MCV RBC AUTO: 88 FL (ref 78–100)
MONOCYTES # BLD AUTO: 0.6 10E3/UL (ref 0–1.3)
MONOCYTES NFR BLD AUTO: 6 %
NEUTROPHILS # BLD AUTO: 6.9 10E3/UL (ref 1.6–8.3)
NEUTROPHILS NFR BLD AUTO: 73 %
NRBC # BLD AUTO: 0 10E3/UL
NRBC BLD AUTO-RTO: 0 /100
PLATELET # BLD AUTO: 287 10E3/UL (ref 150–450)
POTASSIUM BLD-SCNC: 4.2 MMOL/L (ref 3.5–5.1)
PROT SERPL-MCNC: 7.7 G/DL (ref 6.4–8.9)
RBC # BLD AUTO: 4.4 10E6/UL (ref 3.8–5.2)
SODIUM SERPL-SCNC: 138 MMOL/L (ref 134–144)
TSH SERPL DL<=0.005 MIU/L-ACNC: 1.46 MU/L (ref 0.4–4)
WBC # BLD AUTO: 9.5 10E3/UL (ref 4–11)

## 2022-03-23 PROCEDURE — 85025 COMPLETE CBC W/AUTO DIFF WBC: CPT | Mod: ZL | Performed by: FAMILY MEDICINE

## 2022-03-23 PROCEDURE — 80053 COMPREHEN METABOLIC PANEL: CPT | Mod: ZL | Performed by: FAMILY MEDICINE

## 2022-03-23 PROCEDURE — 99395 PREV VISIT EST AGE 18-39: CPT | Performed by: FAMILY MEDICINE

## 2022-03-23 PROCEDURE — G0123 SCREEN CERV/VAG THIN LAYER: HCPCS | Performed by: FAMILY MEDICINE

## 2022-03-23 PROCEDURE — 84443 ASSAY THYROID STIM HORMONE: CPT | Mod: ZL | Performed by: FAMILY MEDICINE

## 2022-03-23 PROCEDURE — 93000 ELECTROCARDIOGRAM COMPLETE: CPT | Performed by: INTERNAL MEDICINE

## 2022-03-23 PROCEDURE — 87624 HPV HI-RISK TYP POOLED RSLT: CPT | Mod: ZL | Performed by: FAMILY MEDICINE

## 2022-03-23 PROCEDURE — 36415 COLL VENOUS BLD VENIPUNCTURE: CPT | Mod: ZL | Performed by: FAMILY MEDICINE

## 2022-03-23 ASSESSMENT — PAIN SCALES - GENERAL: PAINLEVEL: NO PAIN (0)

## 2022-03-23 NOTE — PROGRESS NOTES
"Nursing Notes:   Jacinta Mark LPN  3/23/2022  9:57 AM  Signed  Chief Complaint   Patient presents with     Physical       Initial /72   Pulse 85   Temp 97.5  F (36.4  C) (Temporal)   Resp 16   Ht 1.664 m (5' 5.5\")   Wt 88.5 kg (195 lb)   LMP 03/05/2022   SpO2 96%   Breastfeeding No   BMI 31.96 kg/m   Estimated body mass index is 31.96 kg/m  as calculated from the following:    Height as of this encounter: 1.664 m (5' 5.5\").    Weight as of this encounter: 88.5 kg (195 lb).  Medication Reconciliation: complete    FOOD SECURITY SCREENING QUESTIONS  Hunger Vital Signs:  Within the past 12 months we worried whether our food would run out before we got money to buy more. Never  Within the past 12 months the food we bought just didn't last and we didn't have money to get more. Never        Advance care directive on file? no  Advance care directive provided to patient? declined     Jacinta Mark LPN      SUBJECTIVE:  Sherin Antoine  is a 36 year old female who comes in today for physical and complete evaluation.  She is due for Pap smear.  She is vaccinated but not boosted for COVID-19.  She did have Covid about 5 months ago in the fall.  She did not get an influenza shot this year.  Tetanus is up-to-date.  She had normal lipids last year and labs were normal.    Menses are regular. Her  is interested in vasectomy.     She has a lump behind her left ear.  Its not really sore.  Smaller than it was before.  Just wanted me to check it.    She has an occasional neck spasm. She has been seeing Linda Romero for chiropractic and that is better.     She will have an occasional flash in her right eye. She has an upcoming eye doctor appointment.  She wondered if this might have something to do with her neck but I think is unrelated.  Sounds more suspicious for vitreous issue than actual retinal issue.  Discussed what to watch for for something more serious.    She has occasional palpitations. Her " "anxiety is better.  We did not put her on medicine before.  Never really has any heart racing.  She would be interested in pursuing a bit more work-up for her palpitations as we previously discussed.    PHQ 5/5/2017 12/8/2017 8/4/2021   PHQ-9 Total Score 0 0 0   Q9: Thoughts of better off dead/self-harm past 2 weeks Not at all Not at all Not at all     JAVIER-7 SCORE 8/4/2021   Total Score 8 (mild anxiety)   Total Score 8               Past Medical, Family, and Social History reviewed and updated as noted below.   ROS is negative except as noted above       No Known Allergies,   Family History   Problem Relation Age of Onset     Myocardial Infarction Father 32     Other - See Comments Mother         chronic back pain     Cerebrovascular Disease Maternal Grandmother      Heart Disease Paternal Aunt      Heart Disease Paternal Uncle    ,   No current outpatient medications on file.     No current facility-administered medications for this visit.   ,   Past Medical History:   Diagnosis Date     Personal history of other medical treatment (CODE)     No Comments Provided   ,   Patient Active Problem List    Diagnosis Date Noted     Family history of early CAD 03/09/2021     Priority: Medium   ,   Past Surgical History:   Procedure Laterality Date     OTHER SURGICAL HISTORY      [id number],NO PAST SURGERIES    and   Social History     Tobacco Use     Smoking status: Never Smoker     Smokeless tobacco: Never Used   Substance Use Topics     Alcohol use: No     Alcohol/week: 0.0 standard drinks     OBJECTIVE:  /72   Pulse 85   Temp 97.5  F (36.4  C) (Temporal)   Resp 16   Ht 1.664 m (5' 5.5\")   Wt 88.5 kg (195 lb)   LMP 03/05/2022   SpO2 96%   Breastfeeding No   BMI 31.96 kg/m     EXAM:  General Appearance: Pleasant, alert, appropriate appearance for age. No acute distress  Head Exam: Normal. Normocephalic, atraumatic.  Eye Exam: PERRLA, EOMI, conjunctivae, sclerae normal.  Ear Exam: Normal TM's bilaterally. Normal " auditory canals and external ears. Non-tender.  Behind the left ear is a small lump that feels like it is a skin structure.  May be a small sebaceous cyst but is nontender.  Expectant management is advised.  Nose Exam: Normal external nose, mucus membranes, and septum.  OroPharynx Exam:  Dental hygiene adequate. Normal buccal mucosa. Normal pharynx.  Neck Exam:  Supple, no masses or nodes. No bruits  Thyroid Exam: No nodules or enlargement.  Chest/Respiratory Exam: Normal chest wall and respirations. Clear to auscultation.  Breast Exam: No dimpling, nipple retraction or discharge. No masses or nodes.  Cardiovascular Exam: Regular rate and rhythm. S1, S2, no murmur, click, gallop, or rubs.  Gastrointestinal Exam: Soft, non-tender, no masses or organomegaly.  Genitourinary exam: Normal external genitalia. Speculum exam reveals normal appearing cervix.  Pap smear taken.  Bimanual exam reveals normal sized, non tender uterus. Adnexa are negative.  Lymphatic Exam: Non-palpable nodes in neck,clavicular, axillary, or inguinal regions.  Musculoskeletal Exam: Back is straight and non-tender, full ROM of upper and lower extremities.  Foot Exam: Left and right foot: good pedal pulses, no lesions, nail hygiene good.   Skin: no rash or abnormalities  Neurologic Exam:  normal gross motor, tone coordination and no tremor.  Psychiatric Exam: Alert and oriented - appropriate affect.     Results for orders placed or performed in visit on 03/23/22   TSH Reflex GH     Status: Normal   Result Value Ref Range    TSH 1.46 0.40 - 4.00 mU/L   Comprehensive metabolic panel     Status: Normal   Result Value Ref Range    Sodium 138 134 - 144 mmol/L    Potassium 4.2 3.5 - 5.1 mmol/L    Chloride 104 98 - 107 mmol/L    Carbon Dioxide (CO2) 28 21 - 31 mmol/L    Anion Gap 6 3 - 14 mmol/L    Urea Nitrogen 14 7 - 25 mg/dL    Creatinine 0.80 0.60 - 1.20 mg/dL    Calcium 10.0 8.6 - 10.3 mg/dL    Glucose 95 70 - 105 mg/dL    Alkaline Phosphatase 43 34 -  104 U/L    AST 18 13 - 39 U/L    ALT 18 7 - 52 U/L    Protein Total 7.7 6.4 - 8.9 g/dL    Albumin 4.5 3.5 - 5.7 g/dL    Bilirubin Total 1.0 0.3 - 1.0 mg/dL    GFR Estimate >90 >60 mL/min/1.73m2   CBC with platelets and differential     Status: None   Result Value Ref Range    WBC Count 9.5 4.0 - 11.0 10e3/uL    RBC Count 4.40 3.80 - 5.20 10e6/uL    Hemoglobin 13.1 11.7 - 15.7 g/dL    Hematocrit 38.5 35.0 - 47.0 %    MCV 88 78 - 100 fL    MCH 29.8 26.5 - 33.0 pg    MCHC 34.0 31.5 - 36.5 g/dL    RDW 12.3 10.0 - 15.0 %    Platelet Count 287 150 - 450 10e3/uL    % Neutrophils 73 %    % Lymphocytes 20 %    % Monocytes 6 %    % Eosinophils 1 %    % Basophils 0 %    % Immature Granulocytes 0 %    NRBCs per 100 WBC 0 <1 /100    Absolute Neutrophils 6.9 1.6 - 8.3 10e3/uL    Absolute Lymphocytes 1.9 0.8 - 5.3 10e3/uL    Absolute Monocytes 0.6 0.0 - 1.3 10e3/uL    Absolute Eosinophils 0.1 0.0 - 0.7 10e3/uL    Absolute Basophils 0.0 0.0 - 0.2 10e3/uL    Absolute Immature Granulocytes 0.0 <=0.4 10e3/uL    Absolute NRBCs 0.0 10e3/uL   EKG 12-lead, tracing only (Same Day)     Status: None (Preliminary result)   Result Value Ref Range    Systolic Blood Pressure  mmHg    Diastolic Blood Pressure  mmHg    Ventricular Rate 76 BPM    Atrial Rate 76 BPM    AK Interval 140 ms    QRS Duration 78 ms     ms    QTc 429 ms    P Axis 63 degrees    R AXIS 42 degrees    T Axis 26 degrees    Interpretation ECG       Sinus rhythm  Normal ECG  No previous ECGs available     CBC with Platelets & Differential     Status: None    Narrative    The following orders were created for panel order CBC with Platelets & Differential.  Procedure                               Abnormality         Status                     ---------                               -----------         ------                     CBC with platelets and d...[[id number]]                      Final result                 Please view results for these tests on the individual orders.       EKG shows normal sinus rhythm with no acute ST or T wave changes.  Normal axis and intervals.  ASSESSMENT/Plan :    Sherin was seen today for physical.    Diagnoses and all orders for this visit:    Visit for preventive health examination    Screening for malignant neoplasm of cervix  -     Pap Screen with HPV - recommended age 30 - 65 years  -     HPV High Risk Types DNA Cervical; Future    Palpitations  -     Leadless EKG Monitor 8 to 14 Days; Future  -     EKG 12-lead, tracing only (Same Day)  -     Echocardiogram Complete; Future  -     CBC with Platelets & Differential; Future  -     Comprehensive metabolic panel; Future  -     TSH Reflex GH; Future  -     TSH Reflex GH  -     Comprehensive metabolic panel  -     CBC with Platelets & Differential    Screening for deficiency anemia  -     CBC with Platelets & Differential; Future  -     CBC with Platelets & Differential    Screening for metabolic disorder  -     Comprehensive metabolic panel; Future  -     Comprehensive metabolic panel    Screening for thyroid disorder  -     TSH Reflex GH; Future  -     TSH Reflex GH      Will notify of lab results when available. Discussed diet, exercise and healthy lifestyle changes.  Informed her of normal EKG.  Discussed echocardiogram to assess for any structural abnormalities of the heart and also will do a Zio patch and notify of results when available.  I suspect that she may just have some ectopy that she is feeling.  Labs are reassuring.    She will make the appointment with the eye doctor.    Maninder Freeman MD      Answers for HPI/ROS submitted by the patient on 3/23/2022  Frequency of exercise:: 2-3 days/week  Getting at least 3 servings of Calcium per day:: Yes  Diet:: Regular (no restrictions)  Taking medications regularly:: Not Applicable  Medication side effects:: Not applicable  Bi-annual eye exam:: Yes  Dental care twice a year:: Yes  Sleep apnea or symptoms of sleep apnea:: None  Additional concerns  today:: Yes  Duration of exercise:: 15-30 minutes

## 2022-03-23 NOTE — NURSING NOTE
"Chief Complaint   Patient presents with     Physical       Initial /72   Pulse 85   Temp 97.5  F (36.4  C) (Temporal)   Resp 16   Ht 1.664 m (5' 5.5\")   Wt 88.5 kg (195 lb)   LMP 03/05/2022   SpO2 96%   Breastfeeding No   BMI 31.96 kg/m   Estimated body mass index is 31.96 kg/m  as calculated from the following:    Height as of this encounter: 1.664 m (5' 5.5\").    Weight as of this encounter: 88.5 kg (195 lb).  Medication Reconciliation: complete    FOOD SECURITY SCREENING QUESTIONS  Hunger Vital Signs:  Within the past 12 months we worried whether our food would run out before we got money to buy more. Never  Within the past 12 months the food we bought just didn't last and we didn't have money to get more. Never        Advance care directive on file? no  Advance care directive provided to patient? declined     Jacinta Mark LPN  "

## 2022-03-25 LAB
ATRIAL RATE - MUSE: 76 BPM
BKR LAB AP GYN ADEQUACY: NORMAL
BKR LAB AP GYN INTERPRETATION: NORMAL
BKR LAB AP HPV REFLEX: NORMAL
BKR LAB AP LMP: NORMAL
BKR LAB AP PREVIOUS ABNORMAL: NORMAL
DIASTOLIC BLOOD PRESSURE - MUSE: NORMAL MMHG
INTERPRETATION ECG - MUSE: NORMAL
P AXIS - MUSE: 63 DEGREES
PATH REPORT.COMMENTS IMP SPEC: NORMAL
PATH REPORT.COMMENTS IMP SPEC: NORMAL
PATH REPORT.RELEVANT HX SPEC: NORMAL
PR INTERVAL - MUSE: 140 MS
QRS DURATION - MUSE: 78 MS
QT - MUSE: 382 MS
QTC - MUSE: 429 MS
R AXIS - MUSE: 42 DEGREES
SYSTOLIC BLOOD PRESSURE - MUSE: NORMAL MMHG
T AXIS - MUSE: 26 DEGREES
VENTRICULAR RATE- MUSE: 76 BPM

## 2022-03-28 LAB
HUMAN PAPILLOMA VIRUS 16 DNA: NEGATIVE
HUMAN PAPILLOMA VIRUS 18 DNA: NEGATIVE
HUMAN PAPILLOMA VIRUS FINAL DIAGNOSIS: NORMAL
HUMAN PAPILLOMA VIRUS OTHER HR: NEGATIVE

## 2022-04-25 ENCOUNTER — HOSPITAL ENCOUNTER (OUTPATIENT)
Dept: CARDIOLOGY | Facility: OTHER | Age: 37
Discharge: HOME | End: 2022-04-25
Attending: FAMILY MEDICINE | Admitting: FAMILY MEDICINE
Payer: COMMERCIAL

## 2022-04-25 DIAGNOSIS — R00.2 PALPITATIONS: ICD-10-CM

## 2022-04-25 PROCEDURE — 999N000157 HC STATISTIC RCP TIME EA 10 MIN

## 2022-04-25 PROCEDURE — 93248 EXT ECG>7D<15D REV&INTERPJ: CPT | Performed by: INTERNAL MEDICINE

## 2022-04-25 PROCEDURE — 93246 EXT ECG>7D<15D RECORDING: CPT

## 2022-04-25 NOTE — PATIENT INSTRUCTIONS
Date Placed on Pt:  04/25/2022    Patient instructed not to:   -take baths, swim, sauna   -remove device prior to 14 days   -use electric blankets   -shower or sweat excessively within first 24 hours of device application  Patient instructed to:   -shower as needed   -be carefull when toweling off and dressing   -press button when cardiac symptoms occur   -document symptoms in log book   -remove and return device (send via mail to [facility])   -Call [employer] with any questions

## 2022-05-09 ENCOUNTER — HOSPITAL ENCOUNTER (OUTPATIENT)
Dept: CARDIOLOGY | Facility: OTHER | Age: 37
Discharge: HOME | End: 2022-05-09
Attending: FAMILY MEDICINE | Admitting: FAMILY MEDICINE
Payer: COMMERCIAL

## 2022-05-09 DIAGNOSIS — R00.2 PALPITATIONS: ICD-10-CM

## 2022-05-09 LAB — LVEF ECHO: NORMAL

## 2022-05-09 PROCEDURE — 93306 TTE W/DOPPLER COMPLETE: CPT | Mod: 26 | Performed by: STUDENT IN AN ORGANIZED HEALTH CARE EDUCATION/TRAINING PROGRAM

## 2022-05-09 PROCEDURE — 93306 TTE W/DOPPLER COMPLETE: CPT

## 2023-01-09 ENCOUNTER — MYC MEDICAL ADVICE (OUTPATIENT)
Dept: FAMILY MEDICINE | Facility: OTHER | Age: 38
End: 2023-01-09

## 2023-01-12 ENCOUNTER — MYC MEDICAL ADVICE (OUTPATIENT)
Dept: FAMILY MEDICINE | Facility: OTHER | Age: 38
End: 2023-01-12
Payer: COMMERCIAL

## 2023-01-12 NOTE — TELEPHONE ENCOUNTER
I printed the form and it is in your in box.  Jacinta Mark LPN..................1/12/2023   4:34 PM'

## 2023-01-17 NOTE — TELEPHONE ENCOUNTER
The patient was told that her form is done. She stated to fax it to her at work . This was done.  Jacinta Mark LPN..................1/17/2023   3:36 PM

## 2023-05-26 ENCOUNTER — OFFICE VISIT (OUTPATIENT)
Dept: FAMILY MEDICINE | Facility: OTHER | Age: 38
End: 2023-05-26
Payer: COMMERCIAL

## 2023-05-26 VITALS
HEIGHT: 66 IN | SYSTOLIC BLOOD PRESSURE: 106 MMHG | TEMPERATURE: 98.1 F | WEIGHT: 199.6 LBS | OXYGEN SATURATION: 97 % | RESPIRATION RATE: 18 BRPM | HEART RATE: 80 BPM | DIASTOLIC BLOOD PRESSURE: 78 MMHG | BODY MASS INDEX: 32.08 KG/M2

## 2023-05-26 DIAGNOSIS — H66.002 NON-RECURRENT ACUTE SUPPURATIVE OTITIS MEDIA OF LEFT EAR WITHOUT SPONTANEOUS RUPTURE OF TYMPANIC MEMBRANE: Primary | ICD-10-CM

## 2023-05-26 PROCEDURE — 99213 OFFICE O/P EST LOW 20 MIN: CPT

## 2023-05-26 RX ORDER — CEFDINIR 300 MG/1
300 CAPSULE ORAL 2 TIMES DAILY
Qty: 14 CAPSULE | Refills: 0 | Status: SHIPPED | OUTPATIENT
Start: 2023-05-26 | End: 2023-06-02

## 2023-05-26 ASSESSMENT — PAIN SCALES - GENERAL: PAINLEVEL: MILD PAIN (3)

## 2023-05-26 NOTE — PROGRESS NOTES
ASSESSMENT/PLAN:    I have reviewed the nursing notes.  I have reviewed the findings, diagnosis, plan and need for follow up with the patient.    1. Non-recurrent acute suppurative otitis media of left ear without spontaneous rupture of tympanic membrane  - cefdinir (OMNICEF) 300 MG capsule; Take 1 capsule (300 mg) by mouth 2 times daily for 7 days  Dispense: 14 capsule; Refill: 0    Physical exam and symptoms consistent with left otitis media.  Will treat with Omnicef twice a day for 7 days.  Advised patient that she can take Tylenol and ibuprofen as needed for pain.  Also advised patient that she should try an over-the-counter antihistamine such as Claritin or Zyrtec to help open her eustachian tubes to facilitate drainage of the fluid behind her TM.    Discussed warning signs/symptoms indicative of need to f/u    Follow up if symptoms persist or worsen or concerns    I explained my diagnostic considerations and recommendations to the patient, who voiced understanding and agreement with the treatment plan. All questions were answered. We discussed potential side effects of any prescribed or recommended therapies, as well as expectations for response to treatments.    John Rodríguez, KARLI CNP  5/26/2023  2:15 PM    HPI:    Sherin Antoine is a 37 year old female  who presents to [facility] today for concerns of ear pain    left ear pain x 5 days duration.     Presence of the following:   No fevers or chills.   Yes sore throat/pharyngitis/tonsillitis.   Yes allergy/URI Symptoms  Yes Muffled Sounds/Change in Hearing  Yes Sensation of Fullness in Ear(s)  No Ringing in Ears/Tinnitus  No Balance Changes  No Dizziness  No Headache   No Ear Drainage  Additional Symptoms: No  Denies persistent hearing loss, foul smelling odor from ear, changes in vision, nausea, vomiting, diarrhea, chest pain, shortness of breath.     No Recent swimming/hot tub  No submerging of head in shower/bathtub.     Yes Recent URI or other  "illness  History of otitis media: No  History of HEENT surgery (PE tubes, tonsillectomy/adenoidectomy, etc.): No  Recent Course of ABX: No    Treatments Tried: none  Prior History of Similar Symptoms: No    PCP - Dr. Freeman    Past Medical History:   Diagnosis Date     Personal history of other medical treatment (CODE)     No Comments Provided     Past Surgical History:   Procedure Laterality Date     OTHER SURGICAL HISTORY      [id number],NO PAST SURGERIES     Social History     Tobacco Use     Smoking status: Never     Passive exposure: Past     Smokeless tobacco: Never   Vaping Use     Vaping status: Never Used     Passive vaping exposure: Yes   Substance Use Topics     Alcohol use: No     Alcohol/week: 0.0 standard drinks of alcohol     No current outpatient medications on file.     No Known Allergies  Past medical history, past surgical history, current medications and allergies reviewed and accurate to the best of my knowledge.      ROS:  Refer to HPI    /78 (BP Location: Right arm, Patient Position: Sitting, Cuff Size: Adult Regular)   Pulse 80   Temp 98.1  F (36.7  C) (Tympanic)   Resp 18   Ht 1.664 m (5' 5.5\")   Wt 90.5 kg (199 lb 9.6 oz)   LMP 05/17/2023 (Exact Date)   SpO2 97%   BMI 32.71 kg/m      EXAM:  General Appearance: Well appearing 37 year old female, appropriate appearance for age. No acute distress   Ears: Left TM intact, mild erythema, effusion, bulging, and purulence.  Right TM intact, translucent with bony landmarks appreciated, no erythema, no effusion, no bulging, no purulence.  Left auditory canal clear.  Right auditory canal clear.  Normal external ears, non tender.  Eyes: conjunctivae normal without erythema or irritation, corneas clear, no drainage or crusting, no eyelid swelling, pupils equal   Oropharynx: moist mucous membranes, posterior pharynx without erythema, tonsils symmetric and 1+, no erythema, no exudates or petechiae, no post nasal drip seen, no trismus, voice " clear.    Nose:  Bilateral nares: no erythema, no edema, no drainage or congestion   Respiratory: normal chest wall and respirations.  Normal effort.  Clear to auscultation bilaterally, no wheezing, crackles or rhonchi.  No increased work of breathing.  No cough appreciated.  Cardiac: RRR with no murmurs  Neuro: Alert and oriented to person, place, and time. Psychological: normal affect, alert, oriented, and pleasant.

## 2023-05-26 NOTE — NURSING NOTE
Pt presents to clinic today for plugged and painful/uncomfortable ear left ear x5 days.       FOOD SECURITY SCREENING QUESTIONS:    The next two questions are to help us understand your food security.  If you are feeling you need any assistance in this area, we have resources available to support you today.    Hunger Vital Signs:  Within the past 12 months we worried whether our food would run out before we got money to buy more. Never  Within the past 12 months the food we bought just didn't last and we didn't have money to get more. Never      Medication Reconciliation: complete  Rama Gibbs LPN,LPN on 5/26/2023 at 2:07 PM

## 2023-06-04 ENCOUNTER — HEALTH MAINTENANCE LETTER (OUTPATIENT)
Age: 38
End: 2023-06-04

## 2023-09-20 ENCOUNTER — OFFICE VISIT (OUTPATIENT)
Dept: FAMILY MEDICINE | Facility: OTHER | Age: 38
End: 2023-09-20
Attending: FAMILY MEDICINE
Payer: COMMERCIAL

## 2023-09-20 VITALS
DIASTOLIC BLOOD PRESSURE: 64 MMHG | BODY MASS INDEX: 32.4 KG/M2 | WEIGHT: 201.6 LBS | HEART RATE: 88 BPM | HEIGHT: 66 IN | SYSTOLIC BLOOD PRESSURE: 122 MMHG | TEMPERATURE: 97.6 F | RESPIRATION RATE: 16 BRPM | OXYGEN SATURATION: 97 %

## 2023-09-20 DIAGNOSIS — R22.9 LUMP OF SKIN: Primary | ICD-10-CM

## 2023-09-20 DIAGNOSIS — J06.9 VIRAL URI: ICD-10-CM

## 2023-09-20 PROCEDURE — 99213 OFFICE O/P EST LOW 20 MIN: CPT | Performed by: FAMILY MEDICINE

## 2023-09-20 ASSESSMENT — PAIN SCALES - GENERAL: PAINLEVEL: NO PAIN (0)

## 2023-09-20 NOTE — PROGRESS NOTES
"      Andrea Duff is a 38 year old, presenting for the following health issues:  Derm Problem (Bump behind left ear, has had it checked before.  States it has become bigger. )      9/20/2023     7:50 AM   Additional Questions   Roomed by Kari   Accompanied by self       History of Present Illness       Reason for visit:  Yearly check up and lump behind ear    She eats 4 or more servings of fruits and vegetables daily.She consumes 1 sweetened beverage(s) daily.She exercises with enough effort to increase her heart rate 10 to 19 minutes per day.  She exercises with enough effort to increase her heart rate 3 or less days per week.   She is taking medications regularly.                 Review of Systems         Objective    /64 (BP Location: Right arm, Patient Position: Sitting, Cuff Size: Adult Regular)   Pulse 88   Temp 97.6  F (36.4  C) (Tympanic)   Resp 16   Ht 1.664 m (5' 5.5\")   Wt 91.4 kg (201 lb 9.6 oz)   LMP 09/05/2023   SpO2 97%   BMI 33.04 kg/m    Body mass index is 33.04 kg/m .  Physical Exam                       "

## 2023-09-20 NOTE — NURSING NOTE
"Chief Complaint   Patient presents with    Derm Problem     Bump behind left ear, has had it checked before.  States it has become bigger.        Initial /64 (BP Location: Right arm, Patient Position: Sitting, Cuff Size: Adult Regular)   Pulse 88   Temp 97.6  F (36.4  C) (Tympanic)   Resp 16   Ht 1.664 m (5' 5.5\")   Wt 91.4 kg (201 lb 9.6 oz)   LMP 09/05/2023   SpO2 97%   BMI 33.04 kg/m   Estimated body mass index is 33.04 kg/m  as calculated from the following:    Height as of this encounter: 1.664 m (5' 5.5\").    Weight as of this encounter: 91.4 kg (201 lb 9.6 oz).  Medication Reconciliation: complete    Kari Dawson, LPN    Advance Care Directive reviewed    "

## 2023-10-05 ENCOUNTER — OFFICE VISIT (OUTPATIENT)
Dept: SURGERY | Facility: OTHER | Age: 38
End: 2023-10-05
Attending: SURGERY
Payer: COMMERCIAL

## 2023-10-05 VITALS
SYSTOLIC BLOOD PRESSURE: 122 MMHG | BODY MASS INDEX: 32.47 KG/M2 | DIASTOLIC BLOOD PRESSURE: 78 MMHG | WEIGHT: 202 LBS | HEIGHT: 66 IN | OXYGEN SATURATION: 98 % | HEART RATE: 84 BPM | RESPIRATION RATE: 18 BRPM | TEMPERATURE: 97.4 F

## 2023-10-05 DIAGNOSIS — L72.9 SKIN CYST: Primary | ICD-10-CM

## 2023-10-05 PROCEDURE — 99202 OFFICE O/P NEW SF 15 MIN: CPT | Performed by: SURGERY

## 2023-10-05 ASSESSMENT — PAIN SCALES - GENERAL: PAINLEVEL: NO PAIN (0)

## 2023-10-05 NOTE — PROGRESS NOTES
Surgical Clinic Consult  Referring physician:  Maninder Freeman   Primary physician:     Maninder Freeman    Chief complaint:   nodule by left ear    History of present illness:  This is a 38 year old female I am seeing in consultation for a nodule by left ear. It has decreased in size. No redness or drainage. Had nodule on left neck 10 years ago.     Past medical history:   Past Medical History:   Diagnosis Date    Personal history of other medical treatment (CODE)     No Comments Provided       Pastsurgical history:  Past Surgical History:   Procedure Laterality Date    OTHER SURGICAL HISTORY      [id number],NO PAST SURGERIES       Current medications:  No current outpatient medications on file.       Allergies:  No Known Allergies    Family history:  Family History   Problem Relation Age of Onset    Myocardial Infarction Father 32    Other - See Comments Mother         chronic back pain    Cerebrovascular Disease Maternal Grandmother     Heart Disease Paternal Aunt     Heart Disease Paternal Uncle        Social history:  Social History     Socioeconomic History    Marital status:      Spouse name: Not on file    Number of children: Not on file    Years of education: Not on file    Highest education level: Not on file   Occupational History    Not on file   Tobacco Use    Smoking status: Never     Passive exposure: Past    Smokeless tobacco: Never   Vaping Use    Vaping Use: Never used   Substance and Sexual Activity    Alcohol use: No     Alcohol/week: 0.0 standard drinks of alcohol    Drug use: No    Sexual activity: Yes     Partners: Male   Other Topics Concern    Parent/sibling w/ CABG, MI or angioplasty before 65F 55M? Not Asked   Social History Narrative    Went to [employer].  Hopes to become a pharmacist. Degree in biology.   .   - [employer] - works in wildlife fire management  Son - Osmar  Son - Faraz  Son - Cody     Social Determinants of Health     Financial Resource Strain: Not on file  "  Food Insecurity: Not on file   Transportation Needs: Not on file   Physical Activity: Not on file   Stress: Not on file   Social Connections: Not on file   Interpersonal Safety: Low Risk  (10/5/2023)    Interpersonal Safety     Do you feel physically and emotionally safe where you currently live?: Yes     Within the past 12 months, have you been hit, slapped, kicked or otherwise physically hurt by someone?: No     Within the past 12 months, have you been humiliated or emotionally abused in other ways by your partner or ex-partner?: No   Housing Stability: Not on file       PROBLEM LIST:  Patient Active Problem List   Diagnosis    Family history of early CAD           Physical exam: /78 (BP Location: Left arm, Patient Position: Sitting, Cuff Size: Adult Large)   Pulse 84   Temp 97.4  F (36.3  C) (Tympanic)   Resp 18   Ht 1.664 m (5' 5.5\")   Wt 91.6 kg (202 lb)   LMP 09/05/2023   SpO2 98%   Breastfeeding No   BMI 33.10 kg/m      General: this is a pleasant female patient in no acute distress.  Patient is awake alert and oriented x3 .   EXAM:  Left ear: tiny subcutaneous nodule left posterior/inferior near earlobe. Difficult to appreciate    Assessment:   Skin cyst by left ear. Patient re-assured that it is minute and not infected and likely benign sebaceous cyst given location.     Plan:    Follow-up if enlarges or becomes red or tender      Geovanni Peterson MD        "

## 2023-10-05 NOTE — NURSING NOTE
"Chief Complaint   Patient presents with    Derm Problem     Here today with a lump behind her left ear.       Initial /78 (BP Location: Left arm, Patient Position: Sitting, Cuff Size: Adult Large)   Pulse 84   Temp 97.4  F (36.3  C) (Tympanic)   Resp 18   Ht 1.664 m (5' 5.5\")   Wt 91.6 kg (202 lb)   LMP 09/05/2023   SpO2 98%   Breastfeeding No   BMI 33.10 kg/m   Estimated body mass index is 33.1 kg/m  as calculated from the following:    Height as of this encounter: 1.664 m (5' 5.5\").    Weight as of this encounter: 91.6 kg (202 lb).  Medication Reconciliation: complete    Janessa Villavicencio LPN    "

## 2023-10-24 ENCOUNTER — OFFICE VISIT (OUTPATIENT)
Dept: FAMILY MEDICINE | Facility: OTHER | Age: 38
End: 2023-10-24
Payer: COMMERCIAL

## 2023-10-24 VITALS
HEART RATE: 72 BPM | RESPIRATION RATE: 16 BRPM | WEIGHT: 204.3 LBS | SYSTOLIC BLOOD PRESSURE: 122 MMHG | HEIGHT: 66 IN | TEMPERATURE: 97.5 F | OXYGEN SATURATION: 100 % | BODY MASS INDEX: 32.83 KG/M2 | DIASTOLIC BLOOD PRESSURE: 84 MMHG
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
## 2023-10-24 PROCEDURE — 99212 OFFICE O/P EST SF 10 MIN: CPT | Performed by: NURSE PRACTITIONER

## 2023-10-24 ASSESSMENT — PAIN SCALES - GENERAL: PAINLEVEL: NO PAIN (0)

## 2023-10-24 NOTE — PATIENT INSTRUCTIONS
Too late for prophylaxis. Being you do not have any symptoms; would not warrant treatment. Too early to accurately test your blood.     In the future if you get a deer tick bite, try to come in as soon as possible ( within 72 hours of removing the tick ) for a one time dose of preventative doxycycline.

## 2023-10-24 NOTE — PROGRESS NOTES
ASSESSMENT/PLAN:    I have reviewed the nursing notes.  I have reviewed the findings, diagnosis, plan and need for follow up with the patient.    1. Tick bite, unspecified site, initial encounter  Out of window for prophylactic doxy. Too early to test accurately for lyme plus no symptoms. Discussed warning signs of tickborne illness and when to return. Educated about prophylactic doxycycline criteria for any future tick bites.     Discussed warning signs/symptoms indicative of need to f/u    Follow up if symptoms persist or worsen or concerns    I explained my diagnostic considerations and recommendations to the patient, who voiced understanding and agreement with the treatment plan. All questions were answered. We discussed potential side effects of any prescribed or recommended therapies, as well as expectations for response to treatments.    Caitlin Meneses NP  10/24/2023  5:21 PM    HPI:  Sherin Antoine is a 38 year old female who presents to [facility] today for concerns of tick bite she found last Thursday. She doesn't have a red dot or anything. She had felt nauseous that day before she noticed it, other than that she hasn't had symptoms of anything else. She said it was a deer tick. Nausea was the first day of the tick bite. Tick bite was on right shoulder. It was on her pretty good, engorged. Not sure how long it was attached to her.     Was not sure if she needed to come in. Headache today but gets headaches.     No rash, joint pain, body aches, fevers.     ROS otherwise negative.     Past Medical History:   Diagnosis Date    Personal history of other medical treatment (CODE)     No Comments Provided     Past Surgical History:   Procedure Laterality Date    OTHER SURGICAL HISTORY      [id number],NO PAST SURGERIES     Social History     Tobacco Use    Smoking status: Never     Passive exposure: Past    Smokeless tobacco: Never   Substance Use Topics    Alcohol use: No     Alcohol/week: 0.0 standard drinks  "of alcohol     No current outpatient medications on file.     No Known Allergies  Past medical history, past surgical history, current medications and allergies reviewed and accurate to the best of my knowledge.      ROS:  Refer to HPI    /84   Pulse 72   Temp 97.5  F (36.4  C) (Tympanic)   Resp 16   Ht 1.664 m (5' 5.5\")   Wt 92.7 kg (204 lb 4.8 oz)   LMP 10/04/2023 (Exact Date)   SpO2 100%   BMI 33.48 kg/m      EXAM:  General Appearance: Well appearing 38 year old female, appropriate appearance for age. No acute distress   Respiratory:  No increased work of breathing.  No cough appreciated.  Dermatological: +right posterior shoulder: small scab without surrounding erythema or drainage. No rashes.   Neuro: Alert and oriented to person, place, and time.    Psychological: normal affect, alert, oriented, and pleasant.     "

## 2023-10-24 NOTE — NURSING NOTE
"Chief Complaint   Patient presents with    Insect Bites     Tick   Patient presents to clinic for a tick bite she found last Thursday. She doesn't have a red dot or anything. She had felt nauseous that day before she noticed it, other than that she hasn't had symptoms of anything else. She said it was a deer tick.      Kelly Soto on 10/24/2023 at 5:16 PM        Initial /84   Pulse 72   Temp 97.5  F (36.4  C) (Tympanic)   Resp 16   Ht 1.664 m (5' 5.5\")   Wt 92.7 kg (204 lb 4.8 oz)   LMP 10/04/2023 (Exact Date)   SpO2 100%   BMI 33.48 kg/m   Estimated body mass index is 33.48 kg/m  as calculated from the following:    Height as of this encounter: 1.664 m (5' 5.5\").    Weight as of this encounter: 92.7 kg (204 lb 4.8 oz).       FOOD SECURITY SCREENING QUESTIONS:    The next two questions are to help us understand your food security.  If you are feeling you need any assistance in this area, we have resources available to support you today.    Hunger Vital Signs:  Within the past 12 months we worried whether our food would run out before we got money to buy more. Never  Within the past 12 months the food we bought just didn't last and we didn't have money to get more. Never      Kelly Soto     "

## 2023-11-10 ENCOUNTER — OFFICE VISIT (OUTPATIENT)
Dept: FAMILY MEDICINE | Facility: OTHER | Age: 38
End: 2023-11-10
Payer: COMMERCIAL

## 2023-11-10 VITALS
HEART RATE: 86 BPM | SYSTOLIC BLOOD PRESSURE: 128 MMHG | TEMPERATURE: 97.7 F | DIASTOLIC BLOOD PRESSURE: 82 MMHG | BODY MASS INDEX: 31.74 KG/M2 | HEIGHT: 66 IN | WEIGHT: 197.5 LBS | OXYGEN SATURATION: 98 % | RESPIRATION RATE: 18 BRPM

## 2023-11-10 DIAGNOSIS — R19.7 DIARRHEA, UNSPECIFIED TYPE: Primary | ICD-10-CM

## 2023-11-10 DIAGNOSIS — M79.10 MYALGIA: ICD-10-CM

## 2023-11-10 DIAGNOSIS — R10.9 STOMACH ACHE: ICD-10-CM
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
## 2023-11-10 LAB
ALBUMIN SERPL BCG-MCNC: 4.6 G/DL (ref 3.5–5.2)
ALP SERPL-CCNC: 63 U/L (ref 35–104)
ALT SERPL W P-5'-P-CCNC: 17 U/L (ref 0–50)
ANION GAP SERPL CALCULATED.3IONS-SCNC: 11 MMOL/L (ref 7–15)
AST SERPL W P-5'-P-CCNC: 20 U/L (ref 0–45)
BASOPHILS # BLD AUTO: 0 10E3/UL (ref 0–0.2)
BASOPHILS NFR BLD AUTO: 0 %
BILIRUB SERPL-MCNC: 1.3 MG/DL
BUN SERPL-MCNC: 14.8 MG/DL (ref 6–20)
CALCIUM SERPL-MCNC: 9.5 MG/DL (ref 8.6–10)
CHLORIDE SERPL-SCNC: 101 MMOL/L (ref 98–107)
CREAT SERPL-MCNC: 0.74 MG/DL (ref 0.51–0.95)
DEPRECATED HCO3 PLAS-SCNC: 24 MMOL/L (ref 22–29)
EGFRCR SERPLBLD CKD-EPI 2021: >90 ML/MIN/1.73M2
EOSINOPHIL # BLD AUTO: 0 10E3/UL (ref 0–0.7)
EOSINOPHIL NFR BLD AUTO: 0 %
ERYTHROCYTE [DISTWIDTH] IN BLOOD BY AUTOMATED COUNT: 12.1 % (ref 10–15)
FLUAV RNA SPEC QL NAA+PROBE: NEGATIVE
FLUBV RNA RESP QL NAA+PROBE: NEGATIVE
GLUCOSE SERPL-MCNC: 90 MG/DL (ref 70–99)
HCT VFR BLD AUTO: 39.2 % (ref 35–47)
HGB BLD-MCNC: 13.5 G/DL (ref 11.7–15.7)
IMM GRANULOCYTES # BLD: 0 10E3/UL
IMM GRANULOCYTES NFR BLD: 0 %
LYMPHOCYTES # BLD AUTO: 1 10E3/UL (ref 0.8–5.3)
LYMPHOCYTES NFR BLD AUTO: 12 %
MCH RBC QN AUTO: 29.7 PG (ref 26.5–33)
MCHC RBC AUTO-ENTMCNC: 34.4 G/DL (ref 31.5–36.5)
MCV RBC AUTO: 86 FL (ref 78–100)
MONOCYTES # BLD AUTO: 0.5 10E3/UL (ref 0–1.3)
MONOCYTES NFR BLD AUTO: 7 %
NEUTROPHILS # BLD AUTO: 6.4 10E3/UL (ref 1.6–8.3)
NEUTROPHILS NFR BLD AUTO: 81 %
NRBC # BLD AUTO: 0 10E3/UL
NRBC BLD AUTO-RTO: 0 /100
PLATELET # BLD AUTO: 239 10E3/UL (ref 150–450)
POTASSIUM SERPL-SCNC: 3.4 MMOL/L (ref 3.4–5.3)
PROT SERPL-MCNC: 7.8 G/DL (ref 6.4–8.3)
RBC # BLD AUTO: 4.54 10E6/UL (ref 3.8–5.2)
RSV RNA SPEC NAA+PROBE: NEGATIVE
SARS-COV-2 RNA RESP QL NAA+PROBE: NEGATIVE
SODIUM SERPL-SCNC: 136 MMOL/L (ref 135–145)
WBC # BLD AUTO: 7.9 10E3/UL (ref 4–11)

## 2023-11-10 PROCEDURE — 87637 SARSCOV2&INF A&B&RSV AMP PRB: CPT | Mod: ZL | Performed by: NURSE PRACTITIONER

## 2023-11-10 PROCEDURE — 86618 LYME DISEASE ANTIBODY: CPT | Mod: ZL | Performed by: NURSE PRACTITIONER

## 2023-11-10 PROCEDURE — C9803 HOPD COVID-19 SPEC COLLECT: HCPCS | Performed by: NURSE PRACTITIONER

## 2023-11-10 PROCEDURE — 87469 BABESIA MICROTI AMP PRB: CPT | Mod: ZL | Performed by: NURSE PRACTITIONER

## 2023-11-10 PROCEDURE — 99214 OFFICE O/P EST MOD 30 MIN: CPT | Performed by: NURSE PRACTITIONER

## 2023-11-10 PROCEDURE — 87468 ANAPLSMA PHGCYTOPHLM AMP PRB: CPT | Mod: ZL | Performed by: NURSE PRACTITIONER

## 2023-11-10 PROCEDURE — 85004 AUTOMATED DIFF WBC COUNT: CPT | Mod: ZL | Performed by: NURSE PRACTITIONER

## 2023-11-10 PROCEDURE — 36415 COLL VENOUS BLD VENIPUNCTURE: CPT | Mod: ZL | Performed by: NURSE PRACTITIONER

## 2023-11-10 PROCEDURE — 80053 COMPREHEN METABOLIC PANEL: CPT | Mod: ZL | Performed by: NURSE PRACTITIONER

## 2023-11-10 ASSESSMENT — ENCOUNTER SYMPTOMS
MYALGIAS: 1
COUGH: 1
NAUSEA: 1
CARDIOVASCULAR NEGATIVE: 1
ACTIVITY CHANGE: 1
SORE THROAT: 0
TROUBLE SWALLOWING: 0
APPETITE CHANGE: 1
ABDOMINAL PAIN: 1
DIARRHEA: 1
SHORTNESS OF BREATH: 0
FATIGUE: 1
HEADACHES: 1
WHEEZING: 0
ARTHRALGIAS: 1
FEVER: 0

## 2023-11-10 ASSESSMENT — PAIN SCALES - GENERAL: PAINLEVEL: MODERATE PAIN (5)

## 2023-11-10 NOTE — PROGRESS NOTES
ASSESSMENT/PLAN:    Differential Diagnoses: ***    I have reviewed the nursing notes.  I have reviewed the findings, diagnosis, plan and need for follow up with the patient.    {Dia Picklist:990613}    ***   Discussed with patient viral vs bacterial respiratory illness, and evidence based practice and guidelines for cough without fever or infiltrate on xray are not indicative of pneumonia and should not be treated with antibiotics.    *** Discussed with patient that symptoms and exam are consistent with viral illness.  Discussed that symptomatic treatment of cough is appropriate but not with antibiotics.      *** Symptomatic treatment - Encouraged fluids, salt water gargles, honey (only if greater than 1 year in age due to risk of botulism), elevation, humidifier, sinus rinse/netti pot, lozenges, tea, topical vapor rub, popsicles, rest, etc     *** May use over-the-counter Tylenol or ibuprofen PRN    Discussed warning signs/symptoms indicative of need to f/u    Follow up if symptoms persist or worsen or concerns    I explained my diagnostic considerations and recommendations to the patient, who voiced understanding and agreement with the treatment plan. All questions were answered. We discussed potential side effects of any prescribed or recommended therapies, as well as expectations for response to treatments.    KARLI Wheeler CNP  11/10/2023  1:19 PM    HPI:    Sherin Antoine is a 38 year old female  who presents to [facility] today for concerns of ***    Past Medical History:   Diagnosis Date    Personal history of other medical treatment (CODE)     No Comments Provided     Past Surgical History:   Procedure Laterality Date    OTHER SURGICAL HISTORY      [id number],NO PAST SURGERIES     Social History     Tobacco Use    Smoking status: Never     Passive exposure: Past    Smokeless tobacco: Never   Substance Use Topics    Alcohol use: No     Alcohol/week: 0.0 standard drinks of alcohol     No current  "outpatient medications on file.     No Known Allergies  Past medical history, past surgical history, current medications and allergies reviewed and accurate to the best of my knowledge.      ROS:  Refer to HPI    /82 (BP Location: Right arm, Patient Position: Chair, Cuff Size: Adult Regular)   Pulse 86   Temp 97.7  F (36.5  C) (Tympanic)   Resp 18   Ht 1.664 m (5' 5.5\")   Wt 89.6 kg (197 lb 8 oz)   LMP 10/04/2023 (Exact Date)   SpO2 98%   BMI 32.37 kg/m      EXAM:  General Appearance: Well appearing 38 year old {Desc; male/female:06328}, appropriate appearance for age. No acute distress   Ears: Left TM intact, translucent with bony landmarks appreciated, no erythema, no effusion, no bulging, no purulence.  Right TM intact, translucent with bony landmarks appreciated, no erythema, no effusion, no bulging, no purulence.  Left auditory canal clear.  Right auditory canal clear.  Normal external ears, non tender.  Eyes: conjunctivae normal without erythema or irritation, corneas clear, no drainage or crusting, no eyelid swelling, pupils equal   Oropharynx: moist mucous membranes, posterior pharynx {w-w/o:5700} erythema, tonsils {ENT TONSILS:259994}, no erythema, no exudates or petechiae, no post nasal drip seen, no trismus, voice clear.    Sinuses:  No sinus tenderness upon palpation of the frontal or maxillary sinuses  Nose:  Bilateral nares: no erythema, no edema, no drainage or congestion   Neck: supple without adenopathy  Respiratory: normal chest wall and respirations.  Normal effort.  Clear to auscultation bilaterally, no wheezing, crackles or rhonchi.  No increased work of breathing.  No cough appreciated.  Cardiac: RRR with no murmurs  Abdomen: soft, nontender, no rigidity, no rebound tenderness or guarding, normal bowel sounds present  :  No suprapubic tenderness to palpation.  {PRES/ABS:853671} CVA tenderness to palpation.    Musculoskeletal:  Equal movement of bilateral upper extremities.  " Equal movement of bilateral lower extremities.  Normal gait.    Dermatological: no rashes noted of exposed skin  Neuro: Alert and oriented to person, place, and time.  Cranial nerves II-XII grossly intact with no focal or lateralizing deficits.  Muscle tone normal.  Gait normal. No tremor.   Psychological: normal affect, alert, oriented, and pleasant.     Labs:  ***    Xray:  ***

## 2023-11-10 NOTE — NURSING NOTE
"Chief Complaint   Patient presents with    Illness     X 1 Week  Nausea, Abdominal Cramping, Achy Legs        Initial /82 (BP Location: Right arm, Patient Position: Chair, Cuff Size: Adult Regular)   Pulse 86   Temp 97.7  F (36.5  C) (Tympanic)   Resp 18   Ht 1.664 m (5' 5.5\")   Wt 89.6 kg (197 lb 8 oz)   LMP 10/04/2023 (Exact Date)   SpO2 98%   BMI 32.37 kg/m   Estimated body mass index is 32.37 kg/m  as calculated from the following:    Height as of this encounter: 1.664 m (5' 5.5\").    Weight as of this encounter: 89.6 kg (197 lb 8 oz).    FOOD SECURITY SCREENING QUESTIONS:    The next two questions are to help us understand your food security.  If you are feeling you need any assistance in this area, we have resources available to support you today.    Hunger Vital Signs:  Within the past 12 months we worried whether our food would run out before we got money to buy more. Never  Within the past 12 months the food we bought just didn't last and we didn't have money to get more. Never    Medication Reconciliation: Complete.       Yulissa Faust LPN on 11/10/2023 at 1:05 PM     "

## 2023-11-10 NOTE — PROGRESS NOTES
Sherin Antoine  1985    ASSESSMENT/PLAN:   1. Diarrhea, unspecified type  2. Stomach ache  - CBC and Differential; Future  - Comprehensive Metabolic Panel; Future  - Symptomatic Influenza A/B, RSV, & SARS-CoV2 PCR (COVID-19) Nose    Reviewed possible differentials with patient including acute viral illness, viral gastroenteritis, cholecystitis, or irritable bowel syndrome.  I am more suspicious for acute viral illness.  She does work in the schools.  Recommend obtaining influenza, RSV, COVID-19 testing and we will also check a CBC and CMP to rule out any other acute causes for her symptoms.  Patient is agreeable to plan.  Recommend staying well-hydrated fluids, drinking electrolyte replacements, taking Tylenol ibuprofen as needed for body aches, headache, and encouraged her to get adequate rest.  She knows to return to emergency department if symptoms were to worsen or persist.    3. Myalgia  Bilateral lower extremity myalgias over the past week, did not seem to improve much with ibuprofen.  Reviewed this could be secondary to acute viral illness however will obtain lab work to test for tickborne illness.    4. Tick bite, unspecified site, subsequent encounter  Patient was bit by tick on 10/19/2023, she states it was a deer tick and was engorged.  She presented to clinic on 10/24/2023 outside window to receive prophylactic treatment for Lyme's disease.  With her lower extremity body aches and GI symptoms patient is concerned for tickborne illness.  Patient has not had any rash, specific joint pain.  She has had some fatigue, headaches and lower extremity body aches associated with a few days of upper respiratory symptoms followed by 2 days of diarrhea, abdominal pain and nausea.  Reviewed with patient that symptoms are most consistent with a viral illness however excluding tickborne illness is reasonable.  Tick panel was obtained as well as CBC and CMP.  She will be notified of these results and treatment  appropriately.  - Lyme Disease Total Abs Bld with Reflex to Confirm CLIA; Future  - Babesia Species by PCR; Future  - Ehrlichia Anaplasma Sp by PCR; Future    Patient agrees with plan of care and verbalizes understating. AVS printed. Patient education provided verbally and written instructions provided as requested. Patient made aware of emergent signs and symptoms to monitor for and when to seek additional care/follow up.     SUBJECTIVE:   CHIEF COMPLAINT/ REASON FOR VISIT  Patient presents with:  Illness: X 1 Week  Nausea, Abdominal Cramping, Achy Legs      HISTORY OF PRESENT ILLNESS  Sherin Antoine is a pleasant 38 year old female [facility] with concerns of generally not feeling well.  She states yesterday she developed some nausea and then upper abdominal pain and bloating.  She states she was up all night with upper abdominal pain which would come in waves.  She was not passing gas or belching.  She then started having diarrhea and states she had 7 episodes of watery diarrhea.  She did feel that she was nauseous and had to puke over it was just dry heaving.  She has been fatigued, low energy and headache.  She states earlier this week for couple of days she had some mild nasal congestion, scratchy throat and a little cough.  Today she was coughing up some phlegm but states overall her cold symptoms have resolved.    States she was bit by a tick a few weeks ago, approximately 3 weeks ago.  She was outside of window to receive prophylactic treatment.  She notes that over the past week her legs have been aching and sore.  With her nausea and abdominal discomfort she is wondering if she may have tickborne illness.    Patient works as a  and states she is around many illnesses every day.    I have reviewed the nursing notes.  I have reviewed allergies, medication list, problem list, and past medical history.    REVIEW OF SYSTEMS  Review of Systems   Constitutional:  Positive for  "activity change, appetite change and fatigue. Negative for fever.   HENT:  Positive for congestion. Negative for ear pain, sore throat and trouble swallowing.    Respiratory:  Positive for cough. Negative for shortness of breath and wheezing.    Cardiovascular: Negative.  Negative for chest pain.   Gastrointestinal:  Positive for abdominal pain, diarrhea and nausea.   Genitourinary: Negative.    Musculoskeletal:  Positive for arthralgias and myalgias.   Skin:  Negative for rash.   Neurological:  Positive for headaches.   All other systems reviewed and are negative.       VITAL SIGNS  Vitals:    11/10/23 1302   BP: 128/82   BP Location: Right arm   Patient Position: Chair   Cuff Size: Adult Regular   Pulse: 86   Resp: 18   Temp: 97.7  F (36.5  C)   TempSrc: Tympanic   SpO2: 98%   Weight: 89.6 kg (197 lb 8 oz)   Height: 1.664 m (5' 5.5\")      Body mass index is 32.37 kg/m .    OBJECTIVE:   PHYSICAL EXAM  Physical Exam  Vitals reviewed.   Constitutional:       Appearance: Normal appearance. She is not ill-appearing or toxic-appearing.   HENT:      Head: Normocephalic and atraumatic.      Right Ear: Tympanic membrane, ear canal and external ear normal.      Left Ear: Tympanic membrane, ear canal and external ear normal.      Nose: Congestion present. No rhinorrhea.      Mouth/Throat:      Pharynx: Posterior oropharyngeal erythema present. No oropharyngeal exudate.   Eyes:      Conjunctiva/sclera: Conjunctivae normal.   Cardiovascular:      Rate and Rhythm: Normal rate and regular rhythm.      Pulses: Normal pulses.      Heart sounds: Normal heart sounds.   Pulmonary:      Effort: Pulmonary effort is normal.      Breath sounds: Normal breath sounds. No wheezing or rhonchi.   Abdominal:      Palpations: Abdomen is soft. There is no hepatomegaly or splenomegaly.      Tenderness: There is abdominal tenderness.      Comments: Generalized abdominal tenderness and discomfort, no sharp pain   Musculoskeletal:      Cervical " back: Neck supple. No tenderness.      Right lower leg: No edema.      Left lower leg: No edema.   Lymphadenopathy:      Cervical: No cervical adenopathy.   Skin:     Findings: No rash.   Neurological:      General: No focal deficit present.      Mental Status: She is alert and oriented to person, place, and time.   Psychiatric:         Mood and Affect: Mood normal.         Behavior: Behavior normal.         Thought Content: Thought content normal.         Judgment: Judgment normal.        DIAGNOSTICS  Results for orders placed or performed in visit on 11/10/23   CBC with platelets and differential     Status: None   Result Value Ref Range    WBC Count 7.9 4.0 - 11.0 10e3/uL    RBC Count 4.54 3.80 - 5.20 10e6/uL    Hemoglobin 13.5 11.7 - 15.7 g/dL    Hematocrit 39.2 35.0 - 47.0 %    MCV 86 78 - 100 fL    MCH 29.7 26.5 - 33.0 pg    MCHC 34.4 31.5 - 36.5 g/dL    RDW 12.1 10.0 - 15.0 %    Platelet Count 239 150 - 450 10e3/uL    % Neutrophils 81 %    % Lymphocytes 12 %    % Monocytes 7 %    % Eosinophils 0 %    % Basophils 0 %    % Immature Granulocytes 0 %    NRBCs per 100 WBC 0 <1 /100    Absolute Neutrophils 6.4 1.6 - 8.3 10e3/uL    Absolute Lymphocytes 1.0 0.8 - 5.3 10e3/uL    Absolute Monocytes 0.5 0.0 - 1.3 10e3/uL    Absolute Eosinophils 0.0 0.0 - 0.7 10e3/uL    Absolute Basophils 0.0 0.0 - 0.2 10e3/uL    Absolute Immature Granulocytes 0.0 <=0.4 10e3/uL    Absolute NRBCs 0.0 10e3/uL   CBC and Differential     Status: None    Narrative    The following orders were created for panel order CBC and Differential.  Procedure                               Abnormality         Status                     ---------                               -----------         ------                     CBC with platelets and d...[[id number]]                      Final result                 Please view results for these tests on the individual orders.        Felicia Wadsworth NP  [facility]

## 2023-11-13 LAB
A PHAGOCYTOPH DNA BLD QL NAA+PROBE: NOT DETECTED
B MICROTI DNA BLD QL NAA+PROBE: NOT DETECTED
BABESIA DNA BLD QL NAA+PROBE: NOT DETECTED
E CHAFFEENSIS DNA BLD QL NAA+PROBE: NOT DETECTED
E EWINGII DNA SPEC QL NAA+PROBE: NOT DETECTED
EHRLICHIA DNA SPEC QL NAA+PROBE: NOT DETECTED

## 2023-11-14 LAB — B BURGDOR IGG+IGM SER QL: 0.18

## 2024-02-18 ENCOUNTER — OFFICE VISIT (OUTPATIENT)
Dept: FAMILY MEDICINE | Facility: OTHER | Age: 39
End: 2024-02-18
Payer: COMMERCIAL

## 2024-02-18 VITALS
HEART RATE: 77 BPM | TEMPERATURE: 98.2 F | RESPIRATION RATE: 16 BRPM | HEIGHT: 66 IN | DIASTOLIC BLOOD PRESSURE: 72 MMHG | WEIGHT: 203.4 LBS | OXYGEN SATURATION: 97 % | BODY MASS INDEX: 32.69 KG/M2 | SYSTOLIC BLOOD PRESSURE: 130 MMHG

## 2024-02-18 DIAGNOSIS — H66.93 BILATERAL ACUTE OTITIS MEDIA: Primary | ICD-10-CM

## 2024-02-18 PROCEDURE — 99213 OFFICE O/P EST LOW 20 MIN: CPT

## 2024-02-18 RX ORDER — CEFDINIR 300 MG/1
300 CAPSULE ORAL 2 TIMES DAILY
Qty: 14 CAPSULE | Refills: 0 | Status: SHIPPED | OUTPATIENT
Start: 2024-02-18 | End: 2024-02-25

## 2024-02-18 ASSESSMENT — PAIN SCALES - GENERAL: PAINLEVEL: MILD PAIN (3)

## 2024-02-18 NOTE — PROGRESS NOTES
ASSESSMENT/PLAN:    I have reviewed the nursing notes.  I have reviewed the findings, diagnosis, plan and need for follow up with the patient.    1. Bilateral acute otitis media  - cefdinir (OMNICEF) 300 MG capsule; Take 1 capsule (300 mg) by mouth 2 times daily for 7 days  Dispense: 14 capsule; Refill: 0    Patient presents with bilateral ear pain and pressure.  Patient's vitals are stable and she appears nontoxic.  Physical exam and symptoms consistent with bilateral acute otitis media.  Will treat with Omnicef.  Advised patient that she can take Tylenol and ibuprofen as needed.    Discussed warning signs/symptoms indicative of need to f/u    Follow up if symptoms persist or worsen or concerns    I explained my diagnostic considerations and recommendations to the patient, who voiced understanding and agreement with the treatment plan. All questions were answered. We discussed potential side effects of any prescribed or recommended therapies, as well as expectations for response to treatments.    KARLI Wheeler CNP  2/18/2024  9:53 AM    HPI:    Sherin Antoine is a 38 year old female  who presents to [facility] today for concerns of ear pain    bilateral ear pain x 1 day duration. Ears have been plugged for two weeks.     Presence of the following:   No fevers or chills.   No sore throat/pharyngitis/tonsillitis.   Yes allergy/URI Symptoms  Yes Muffled Sounds/Change in Hearing  Yes Sensation of Fullness in Ear(s)  No Ringing in Ears/Tinnitus  No Balance Changes  Yes Dizziness  No Headache   Additional Symptoms: No  Denies persistent hearing loss, foul smelling odor from ear, changes in vision, nausea, vomiting, diarrhea, chest pain, shortness of breath.     No Recent swimming/hot tub  No submerging of head in shower/bathtub.     Yes Recent URI or other illness  History of otitis media: Yes  History of HEENT surgery (PE tubes, tonsillectomy/adenoidectomy, etc.): No  Recent Course of ABX: No    Treatments  "Tried: none  Prior History of Similar Symptoms: Yes    PCP Ying Freeman    Past Medical History:   Diagnosis Date    Personal history of other medical treatment (CODE)     No Comments Provided     Past Surgical History:   Procedure Laterality Date    OTHER SURGICAL HISTORY      [id number],NO PAST SURGERIES     Social History     Tobacco Use    Smoking status: Never     Passive exposure: Past    Smokeless tobacco: Never   Substance Use Topics    Alcohol use: No     Alcohol/week: 0.0 standard drinks of alcohol     No current outpatient medications on file.     No Known Allergies  Past medical history, past surgical history, current medications and allergies reviewed and accurate to the best of my knowledge.      ROS:  Refer to HPI    /72 (BP Location: Right arm, Patient Position: Sitting, Cuff Size: Adult Regular)   Pulse 77   Temp 98.2  F (36.8  C) (Tympanic)   Resp 16   Ht 1.664 m (5' 5.5\")   Wt 92.3 kg (203 lb 6.4 oz)   LMP 01/31/2024 (Exact Date)   SpO2 97%   BMI 33.33 kg/m      EXAM:  General Appearance: Well appearing 38 year old female, appropriate appearance for age. No acute distress   Ears: Left TM intact, moderate erythema, mild effusion, bulging, and purulence.  Right TM intact, moderate erythema, mild effusion, bulging, and purulence.  Left auditory canal clear.  Right auditory canal clear.  Normal external ears, non tender.  Eyes: conjunctivae normal without erythema or irritation, corneas clear, no drainage or crusting, no eyelid swelling, pupils equal   Oropharynx: moist mucous membranes, posterior pharynx without erythema, tonsils symmetric and 1+, no erythema, no exudates or petechiae, no post nasal drip seen, no trismus, voice clear.    Nose:  Bilateral nares: no erythema, no edema, no drainage or congestion   Neck: supple without adenopathy  Respiratory: normal chest wall and respirations.  Normal effort.  Clear to auscultation bilaterally, no wheezing, crackles or rhonchi.  No increased " work of breathing.  No cough appreciated.  Cardiac: RRR with no murmurs  Musculoskeletal:  Equal movement of bilateral upper extremities.  Equal movement of bilateral lower extremities.  Normal gait.    Dermatological: no rashes noted of exposed skin  Neuro: Alert and oriented to person, place, and time.    Psychological: normal affect, alert, oriented, and pleasant.

## 2024-02-18 NOTE — NURSING NOTE
"Chief Complaint   Patient presents with    Ear Problem     Plugged for 2 weeks, pain last night     Patient here for ear pain. They have been plugged for 2 weeks along with nasal congestion. Pain started last night.     Initial /72 (BP Location: Right arm, Patient Position: Sitting, Cuff Size: Adult Regular)   Pulse 77   Temp 98.2  F (36.8  C) (Tympanic)   Resp 16   Ht 1.664 m (5' 5.5\")   Wt 92.3 kg (203 lb 6.4 oz)   LMP 01/31/2024 (Exact Date)   SpO2 97%   BMI 33.33 kg/m   Estimated body mass index is 33.33 kg/m  as calculated from the following:    Height as of this encounter: 1.664 m (5' 5.5\").    Weight as of this encounter: 92.3 kg (203 lb 6.4 oz).  Medication Review: complete    The next two questions are to help us understand your food security.  If you are feeling you need any assistance in this area, we have resources available to support you today.          2/18/2024   SDOH- Food Insecurity   Within the past 12 months, did you worry that your food would run out before you got money to buy more? N   Within the past 12 months, did the food you bought just not last and you didn t have money to get more? N         Health Care Directive:  Patient does not have a Health Care Directive or Living Will: Discussed advance care planning with patient; however, patient declined at this time.    Ashley Duffy, LPN      "

## 2024-02-18 NOTE — PATIENT INSTRUCTIONS
"Please refer to your AVS for follow up and pain/symptoms management recommendations (I.e.: medications, helpful conservative treatment modalities, appropriate follow up if need to a specialist or family practice, etc.). Please return to urgent care if your symptoms change or worsen.     Discharge instructions:  -If you were prescribed a medication(s), please take this as prescribed/directed  -Monitor your symptoms, if changing/worsening, return to UC/ER or PCP for follow up    - For ear infection. Take entire course of antibiotics to ensure this clears (even if feeling better).  - Tylenol or ibuprofen for pain and fevers.   - Eat yogurt, kefir or take over-the-counter \"probiotic\" at least 2 hours before or after a dose of antibiotic. This will replace good bacteria that may have been lost due to the antibiotic. (This may also help to prevent yeast infections and upset stomach during the course of antibiotic.)  - In the future at onset of congestion: Blow nose or use bulb syringe to keep nasal congestion cleared and use saline nasal spray/flush.  -Alternative ibuprofen and tylenol as needed.   -Rest/relaxation and keeping hydrated with clear liquids (ie: water or gatorade). Using a humidifier may be beneficial as well.     * Recheck with family practice as needed or ER sooner with worsening or concerns.    "

## 2024-05-05 ENCOUNTER — OFFICE VISIT (OUTPATIENT)
Dept: FAMILY MEDICINE | Facility: OTHER | Age: 39
End: 2024-05-05
Attending: REGISTERED NURSE
Payer: COMMERCIAL

## 2024-05-05 VITALS
RESPIRATION RATE: 12 BRPM | BODY MASS INDEX: 31.45 KG/M2 | OXYGEN SATURATION: 99 % | HEART RATE: 83 BPM | DIASTOLIC BLOOD PRESSURE: 78 MMHG | HEIGHT: 66 IN | WEIGHT: 195.7 LBS | TEMPERATURE: 99.7 F | SYSTOLIC BLOOD PRESSURE: 124 MMHG

## 2024-05-05 DIAGNOSIS — J02.9 ACUTE VIRAL PHARYNGITIS: Primary | ICD-10-CM

## 2024-05-05 DIAGNOSIS — R50.9 FEVER IN ADULT: ICD-10-CM

## 2024-05-05 DIAGNOSIS — J02.9 SORE THROAT: ICD-10-CM

## 2024-05-05 LAB — GROUP A STREP BY PCR: NOT DETECTED

## 2024-05-05 PROCEDURE — 87651 STREP A DNA AMP PROBE: CPT | Mod: ZL | Performed by: NURSE PRACTITIONER

## 2024-05-05 PROCEDURE — 99213 OFFICE O/P EST LOW 20 MIN: CPT | Performed by: NURSE PRACTITIONER

## 2024-05-05 ASSESSMENT — PAIN SCALES - GENERAL: PAINLEVEL: MODERATE PAIN (4)

## 2024-05-05 NOTE — NURSING NOTE
"Pt presents to  for sore throat, body aches and headache since yesterday.     Chief Complaint   Patient presents with    Pharyngitis    Headache       FOOD SECURITY SCREENING QUESTIONS  Hunger Vital Signs:  Within the past 12 months we worried whether our food would run out before we got money to buy more. Never  Within the past 12 months the food we bought just didn't last and we didn't have money to get more. Never  Verna Dearmon 5/5/2024 10:36 AM      Initial /78 (BP Location: Left arm, Patient Position: Sitting, Cuff Size: Adult Large)   Pulse 83   Temp 99.7  F (37.6  C) (Tympanic)   Resp 12   Ht 1.664 m (5' 5.5\")   Wt 88.8 kg (195 lb 11.2 oz)   LMP 04/26/2024 (Exact Date)   SpO2 99%   BMI 32.07 kg/m   Estimated body mass index is 32.07 kg/m  as calculated from the following:    Height as of this encounter: 1.664 m (5' 5.5\").    Weight as of this encounter: 88.8 kg (195 lb 11.2 oz).  Medication Reconciliation: complete    Verna Dearmon  "

## 2024-05-05 NOTE — PROGRESS NOTES
ASSESSMENT/PLAN:     I have reviewed the nursing notes.  I have reviewed the findings, diagnosis, plan and need for follow up with the patient.      1. Acute viral pharyngitis  2. Sore throat  3. Fever    - Group A Streptococcus PCR Throat Swab - Negative  - Discussed with patient that symptoms and exam are consistent with viral illness.    - No clinical indications for antibiotic treatment at this time.  - Symptomatic treatment - Encouraged fluids, salt water gargles, honey, elevation, humidifier, saline nasal spray, sinus rinse/netti pot, lozenges, tea, soup, smoothies, popsicles, topical vapor rub, rest, etc   - May use over-the-counter Tylenol or ibuprofen PRN      Discussed warning signs/symptoms indicative of need to f/u  Follow up if symptoms persist or worsen or concerns      I explained my diagnostic considerations and recommendations to the patient, who voiced understanding and agreement with the treatment plan. All questions were answered. We discussed potential side effects of any prescribed or recommended therapies, as well as expectations for response to treatments.    Niharika Colon, CLAUDIA Wadsworth, SUDARSHANP.N.P., R.N., APRN [facility]      SUBJECTIVE:   Sherin Antoine is a 38 year old female who presents to clinic today for the following health issues: sore throat, body aches and headache since this morning.      HPI    Patient presented to \[facility]\"" clinic with sore throat that started last night.  She was at a Lacrosse game yesterday and was not sure if it was related to cheering. She felt mild body aches and headache this morning. No known strep exposure although she works at school, [employer].  No fever, chills last night. No cough, mild nasal congestion this morning. She did have a stomach bug a week ago, now resolved.             Review Of Systems  Noted chills, body aches, sore throat  Skin: negative  Eyes: negative  Ears/Nose/Throat: nasal  "congestion  Respiratory: No shortness of breath, dyspnea on exertion, cough, or hemoptysis  Cardiovascular: negative  Gastrointestinal: negative  Genitourinary: negative  Musculoskeletal: positive for mild body aches  Neurologic: negative      Past Medical History:   Diagnosis Date    Personal history of other medical treatment (CODE)     No Comments Provided     Past Surgical History:   Procedure Laterality Date    OTHER SURGICAL HISTORY      [id number],NO PAST SURGERIES     Social History     Tobacco Use    Smoking status: Never     Passive exposure: Past    Smokeless tobacco: Never   Substance Use Topics    Alcohol use: No     Alcohol/week: 0.0 standard drinks of alcohol     No current outpatient medications on file.     No Known Allergies      Past medical history, past surgical history, current medications and allergies reviewed and accurate to the best of my knowledge.        OBJECTIVE:     /78 (BP Location: Left arm, Patient Position: Sitting, Cuff Size: Adult Large)   Pulse 83   Temp 99.7  F (37.6  C) (Tympanic)   Resp 12   Ht 1.664 m (5' 5.5\")   Wt 88.8 kg (195 lb 11.2 oz)   LMP 04/26/2024 (Exact Date)   SpO2 99%   BMI 32.07 kg/m    Body mass index is 32.07 kg/m .  Physical Exam    General Appearance: Well appearing adult female, appropriate appearance for age. No acute distress  Ears: Left TM intact, no erythema, no effusion, no bulging, no purulence.  Right TM intact, no erythema, no effusion, no bulging, no purulence.  Left auditory canal clear without drainage or bleeding.  Right auditory canal clear without drainage or bleeding.  Normal external ears, non tender.  Eyes: conjunctivae normal without erythema or irritation, corneas clear, no drainage or crusting, no eyelid swelling, pupils equal   Orophayrnx: moist mucous membranes, pharynx without erythema, tonsils without hypertrophy, mild erythema, minimal tonsillar exudates, no oral lesions, no palate petechiae, no post nasal drip seen, no " trismus, voice clear.    Nose:  No noted drainage or congestion   Neck: supple without adenopathy  Respiratory: normal chest wall and respirations.  Normal effort.  Clear to auscultation bilaterally, no wheezing, crackles or rhonchi.  No increased work of breathing.  No cough appreciated.  Cardiac: RRR with no murmurs  Dermatological: no rashes noted of exposed skin  Psychological: normal affect, alert, oriented, and pleasant.       Results for orders placed or performed in visit on 05/05/24   Group A Streptococcus PCR Throat Swab     Status: Normal    Specimen: Throat; Swab   Result Value Ref Range    Group A strep by PCR Not Detected Not Detected    Narrative    The Xpert Xpress Strep A test, performed on the [employer]  Instrument Systems, is a rapid, qualitative in vitro diagnostic test for the detection of Streptococcus pyogenes (Group A ß-hemolytic Streptococcus, Strep A) in throat swab specimens from patients with signs and symptoms of pharyngitis. The Xpert Xpress Strep A test can be used as an aid in the diagnosis of Group A Streptococcal pharyngitis. The assay is not intended to monitor treatment for Group A Streptococcus infections. The Xpert Xpress Strep A test utilizes an automated real-time polymerase chain reaction (PCR) to detect Streptococcus pyogenes DNA.

## 2024-07-13 ENCOUNTER — HEALTH MAINTENANCE LETTER (OUTPATIENT)
Age: 39
End: 2024-07-13

## 2025-03-10 NOTE — PROGRESS NOTES
"Nursing Notes:   Jacinta Mark LPN  3/9/2021  5:02 PM  Signed  Chief Complaint   Patient presents with     Physical       Initial /88   Pulse 112   Temp 98.2  F (36.8  C) (Temporal)   Resp 16   Ht 1.676 m (5' 6\")   Wt 87.5 kg (193 lb)   LMP 02/16/2021   SpO2 99%   Breastfeeding No   BMI 31.15 kg/m   Estimated body mass index is 31.15 kg/m  as calculated from the following:    Height as of this encounter: 1.676 m (5' 6\").    Weight as of this encounter: 87.5 kg (193 lb).  Medication Reconciliation: complete    Jacinta Mark LPN      SUBJECTIVE:  Sherin Antoine  is a 35 year old female who comes in today for complete evaluation.    Her last Pap was normal with HPV negative in 2017 so is good for another year.  She is up-to-date on immunizations and has had 1 COVID-19 vaccine and is due for her second one this week.    She was planning on attending pharmacy school.  She was excepted, they had looked to the house in [place] and were going to move there so she could go to school.  This would have meant that her  would have to quit his job and she would have proved her family.  After the lengthy discussion and soul searching, she elected to decline the spot.  We discussed the fact that she made the best decision for her and her family at that time and support encouragement were offered.    She has a family history of heart disease.  Her dad had an MI at age 32. His siblings had early heart disease.  Her mom's side has no significant heart issues.  Her maternal GM has had a stroke.     BJ lost his dad and his cousin and a friend.  They lost another cousin to Covid.    They bought a house out by [employer]. They have a workout room and a rower. They eat healthy.     She has a lump on her left neck for a year she wants to get it checked. It is not painful.  She will see the chiropractor for a sore neck from time to time.  She had no fever chills night sweats or any systemic symptoms.    She " "has had more anxiety of late.  A lot of this may be due to family stressors and loss.    PHQ 5/5/2017 12/8/2017   PHQ-9 Total Score 0 0   Q9: Thoughts of better off dead/self-harm past 2 weeks Not at all Not at all     No flowsheet data found.          Past Medical, Family, and Social History reviewed and updated as noted below.   ROS is negative except as noted above       No Known Allergies,   Family History   Problem Relation Age of Onset     Other - See Comments Mother         chronic back pain   ,   No current outpatient medications on file.     No current facility-administered medications for this visit.    ,   Past Medical History:   Diagnosis Date     Personal history of other medical treatment (CODE)     No Comments Provided   , There are no active problems to display for this patient.  ,   Past Surgical History:   Procedure Laterality Date     OTHER SURGICAL HISTORY      [id number],NO PAST SURGERIES    and   Social History     Tobacco Use     Smoking status: Never Smoker     Smokeless tobacco: Never Used   Substance Use Topics     Alcohol use: No     Alcohol/week: 0.0 standard drinks     OBJECTIVE:  /88   Pulse 112   Temp 98.2  F (36.8  C) (Temporal)   Resp 16   Ht 1.676 m (5' 6\")   Wt 87.5 kg (193 lb)   LMP 02/16/2021   SpO2 99%   Breastfeeding No   BMI 31.15 kg/m     EXAM:  General Appearance: Pleasant, alert, appropriate appearance for age. No acute distress  Head Exam: Normal. Normocephalic, atraumatic.  Eye Exam: PERRLA, EOMI, conjunctivae, sclerae normal.  Ear Exam: Normal TM's bilaterally. Normal auditory canals and external ears. Non-tender.  Nose Exam: Normal external nose, mucus membranes, and septum.  OroPharynx Exam:  Dental hygiene adequate. Normal buccal mucosa. Normal pharynx.  Neck Exam:  Supple, she has no palpable lymphadenopathy.  On the left sternocleidomastoid area, there is a fusiform fullness that is present that is nontender and mobile but does not feel specifically like " a discrete mass.  Certainly does not feel worrisome.  It is more rubbery and is actually difficult to distinguish from the normal muscle tissue and could just be some fibrous tissue. No bruits  Thyroid Exam: No nodules or enlargement.  Chest/Respiratory Exam: Normal chest wall and respirations. Clear to auscultation.  Breast Exam: No dimpling, nipple retraction or discharge. No masses or nodes.  Cardiovascular Exam: Regular rate and rhythm. S1, S2, no murmur, click, gallop, or rubs.  Gastrointestinal Exam: Soft, non-tender, no masses or organomegaly.  Lymphatic Exam: Non-palpable nodes in neck,clavicular, axillary, or inguinal regions.  Musculoskeletal Exam: Back is straight and non-tender, full ROM of upper and lower extremities.  Foot Exam: Left and right foot: good pedal pulses  Skin: no rash or abnormalities  Neurologic Exam:  normal gross motor, tone coordination and no tremor.  Psychiatric Exam: Alert and oriented - appropriate affect.     Results for orders placed or performed in visit on 03/09/21   CBC with platelets differential     Status: Abnormal   Result Value Ref Range    WBC 12.0 (H) 4.0 - 11.0 10e9/L    RBC Count 4.58 3.8 - 5.2 10e12/L    Hemoglobin 13.6 11.7 - 15.7 g/dL    Hematocrit 39.7 35.0 - 47.0 %    MCV 87 78 - 100 fl    MCH 29.7 26.5 - 33.0 pg    MCHC 34.3 31.5 - 36.5 g/dL    RDW 12.4 10.0 - 15.0 %    Platelet Count 296 150 - 450 10e9/L    Diff Method Automated Method     % Neutrophils 76.3 %    % Lymphocytes 18.0 %    % Monocytes 4.5 %    % Eosinophils 0.4 %    % Basophils 0.2 %    % Immature Granulocytes 0.6 %    Absolute Neutrophil 9.1 (H) 1.6 - 8.3 10e9/L    Absolute Lymphocytes 2.2 0.8 - 5.3 10e9/L    Absolute Monocytes 0.5 0.0 - 1.3 10e9/L    Absolute Eosinophils 0.1 0.0 - 0.7 10e9/L    Absolute Basophils 0.0 0.0 - 0.2 10e9/L    Abs Immature Granulocytes 0.1 0 - 0.4 10e9/L      ASSESSMENT/Plan :    Sherin was seen today for physical.    Diagnoses and all orders for this visit:    Visit  for preventive health examination    Mass of left side of neck  -     CT Soft Tissue Neck w/o Contrast; Future    Screening for deficiency anemia  -     CBC with platelets differential; Future  -     CBC with platelets differential    Screening for metabolic disorder  -     Comprehensive metabolic panel; Future  -     Comprehensive metabolic panel    Screening for hyperlipidemia  -     Lipid Profile; Future  -     Lipid Profile      Will notify of lab results when available. Discussed diet, exercise and healthy lifestyle changes.     Lengthy discussion with regard to her family history of early coronary disease.  Other than family history, she really does not have significant risk factors.  History in the past of elevated cholesterol so we will check that again.  She is too young really to do a calcium score as that is more beneficial at 40.  If she has hyperlipidemia, would err on the side of going ahead and treating and using low-dose aspirin because of family history of early coronary disease.    Support encouragement offered with regard to her multiple psychosocial stressors.    I think her anxiety is due to several factors.  At this point, we elected not to treat with medication but will employ expectant management.    I am not convinced that her neck is anything worrisome but will go ahead with imaging just to be sure and will notify of those results when available.    Maninder Freeman MD             PRE-OP DIAGNOSIS:  Acute cholecystitis 10-Mar-2025 20:29:46  Mann Arreguin

## 2025-08-09 ENCOUNTER — HEALTH MAINTENANCE LETTER (OUTPATIENT)
Age: 40
End: 2025-08-09